# Patient Record
Sex: MALE | Race: WHITE | Employment: UNEMPLOYED | URBAN - METROPOLITAN AREA
[De-identification: names, ages, dates, MRNs, and addresses within clinical notes are randomized per-mention and may not be internally consistent; named-entity substitution may affect disease eponyms.]

---

## 2018-02-07 ENCOUNTER — HOSPITAL ENCOUNTER (INPATIENT)
Age: 23
LOS: 2 days | Discharge: HOME | DRG: 751 | End: 2018-02-09
Attending: PSYCHIATRY & NEUROLOGY | Admitting: PSYCHIATRY & NEUROLOGY
Payer: MEDICAID

## 2018-02-07 ENCOUNTER — HOSPITAL ENCOUNTER (EMERGENCY)
Age: 23
Discharge: ANOTHER HOSPITAL | End: 2018-02-07
Attending: EMERGENCY MEDICINE
Payer: MEDICAID

## 2018-02-07 VITALS
HEIGHT: 69 IN | WEIGHT: 229 LBS | DIASTOLIC BLOOD PRESSURE: 85 MMHG | TEMPERATURE: 98 F | OXYGEN SATURATION: 98 % | BODY MASS INDEX: 33.92 KG/M2 | RESPIRATION RATE: 14 BRPM | SYSTOLIC BLOOD PRESSURE: 132 MMHG | HEART RATE: 89 BPM

## 2018-02-07 DIAGNOSIS — R45.850 HOMICIDAL IDEATION: ICD-10-CM

## 2018-02-07 DIAGNOSIS — R45.851 SUICIDAL IDEATION: Primary | ICD-10-CM

## 2018-02-07 DIAGNOSIS — F29 PSYCHOSIS, UNSPECIFIED PSYCHOSIS TYPE (HCC): ICD-10-CM

## 2018-02-07 LAB
ALBUMIN SERPL-MCNC: 3.8 G/DL (ref 3.5–5)
ALBUMIN/GLOB SERPL: 1 {RATIO} (ref 1.1–2.2)
ALP SERPL-CCNC: 70 U/L (ref 45–117)
ALT SERPL-CCNC: 30 U/L (ref 12–78)
AMPHET UR QL SCN: NEGATIVE
ANION GAP SERPL CALC-SCNC: 10 MMOL/L (ref 5–15)
APPEARANCE UR: CLEAR
AST SERPL-CCNC: 16 U/L (ref 15–37)
BACTERIA URNS QL MICRO: NEGATIVE /HPF
BARBITURATES UR QL SCN: NEGATIVE
BENZODIAZ UR QL: NEGATIVE
BILIRUB SERPL-MCNC: 0.6 MG/DL (ref 0.2–1)
BILIRUB UR QL: NEGATIVE
BUN SERPL-MCNC: 7 MG/DL (ref 6–20)
BUN/CREAT SERPL: 9 (ref 12–20)
CALCIUM SERPL-MCNC: 9.1 MG/DL (ref 8.5–10.1)
CANNABINOIDS UR QL SCN: NEGATIVE
CHLORIDE SERPL-SCNC: 102 MMOL/L (ref 97–108)
CO2 SERPL-SCNC: 27 MMOL/L (ref 21–32)
COCAINE UR QL SCN: NEGATIVE
COLOR UR: NORMAL
CREAT SERPL-MCNC: 0.82 MG/DL (ref 0.7–1.3)
DATE LAST DOSE: ABNORMAL
DRUG SCRN COMMENT,DRGCM: NORMAL
EPITH CASTS URNS QL MICRO: NORMAL /LPF
ERYTHROCYTE [DISTWIDTH] IN BLOOD BY AUTOMATED COUNT: 13.1 % (ref 11.5–14.5)
ETHANOL SERPL-MCNC: <10 MG/DL
GLOBULIN SER CALC-MCNC: 3.8 G/DL (ref 2–4)
GLUCOSE SERPL-MCNC: 151 MG/DL (ref 65–100)
GLUCOSE UR STRIP.AUTO-MCNC: NEGATIVE MG/DL
HCT VFR BLD AUTO: 41.2 % (ref 36.6–50.3)
HGB BLD-MCNC: 14.2 G/DL (ref 12.1–17)
HGB UR QL STRIP: NEGATIVE
KETONES UR QL STRIP.AUTO: NEGATIVE MG/DL
LEUKOCYTE ESTERASE UR QL STRIP.AUTO: NEGATIVE
LITHIUM SERPL-SCNC: 0.48 MMOL/L (ref 0.6–1.2)
MCH RBC QN AUTO: 29 PG (ref 26–34)
MCHC RBC AUTO-ENTMCNC: 34.5 G/DL (ref 30–36.5)
MCV RBC AUTO: 84.1 FL (ref 80–99)
METHADONE UR QL: NEGATIVE
NITRITE UR QL STRIP.AUTO: NEGATIVE
NRBC # BLD: 0 K/UL (ref 0–0.01)
NRBC BLD-RTO: 0 PER 100 WBC
OPIATES UR QL: NEGATIVE
PCP UR QL: NEGATIVE
PH UR STRIP: 7 [PH]
PLATELET # BLD AUTO: 315 K/UL (ref 150–400)
PMV BLD AUTO: 10.2 FL (ref 8.9–12.9)
POTASSIUM SERPL-SCNC: 3.8 MMOL/L (ref 3.5–5.1)
PROT SERPL-MCNC: 7.6 G/DL (ref 6.4–8.2)
PROT UR STRIP-MCNC: NEGATIVE MG/DL
RBC # BLD AUTO: 4.9 M/UL (ref 4.1–5.7)
RBC #/AREA URNS HPF: NORMAL /HPF (ref 0–5)
REPORTED DOSE,DOSE: ABNORMAL UNITS
REPORTED DOSE/TIME,TMG: ABNORMAL
SODIUM SERPL-SCNC: 139 MMOL/L (ref 136–145)
SP GR UR REFRACTOMETRY: 1.01
UR CULT HOLD, URHOLD: NORMAL
UROBILINOGEN UR QL STRIP.AUTO: 0.2 EU/DL
VALPROATE SERPL-MCNC: 73 UG/ML (ref 50–100)
WBC # BLD AUTO: 10.1 K/UL (ref 4.1–11.1)
WBC URNS QL MICRO: NORMAL /HPF (ref 0–4)

## 2018-02-07 PROCEDURE — 90791 PSYCH DIAGNOSTIC EVALUATION: CPT

## 2018-02-07 PROCEDURE — 80307 DRUG TEST PRSMV CHEM ANLYZR: CPT | Performed by: EMERGENCY MEDICINE

## 2018-02-07 PROCEDURE — 80178 ASSAY OF LITHIUM: CPT | Performed by: EMERGENCY MEDICINE

## 2018-02-07 PROCEDURE — 85027 COMPLETE CBC AUTOMATED: CPT | Performed by: EMERGENCY MEDICINE

## 2018-02-07 PROCEDURE — 80164 ASSAY DIPROPYLACETIC ACD TOT: CPT | Performed by: EMERGENCY MEDICINE

## 2018-02-07 PROCEDURE — 74011250637 HC RX REV CODE- 250/637: Performed by: EMERGENCY MEDICINE

## 2018-02-07 PROCEDURE — 36415 COLL VENOUS BLD VENIPUNCTURE: CPT | Performed by: EMERGENCY MEDICINE

## 2018-02-07 PROCEDURE — 81001 URINALYSIS AUTO W/SCOPE: CPT | Performed by: EMERGENCY MEDICINE

## 2018-02-07 PROCEDURE — 65220000003 HC RM SEMIPRIVATE PSYCH

## 2018-02-07 PROCEDURE — 80053 COMPREHEN METABOLIC PANEL: CPT | Performed by: EMERGENCY MEDICINE

## 2018-02-07 PROCEDURE — 99284 EMERGENCY DEPT VISIT MOD MDM: CPT

## 2018-02-07 PROCEDURE — 74011250637 HC RX REV CODE- 250/637: Performed by: PSYCHIATRY & NEUROLOGY

## 2018-02-07 RX ORDER — LITHIUM CARBONATE 300 MG/1
300 CAPSULE ORAL DAILY
Status: ON HOLD | COMMUNITY
End: 2019-07-08

## 2018-02-07 RX ORDER — OLANZAPINE 10 MG/1
10 TABLET ORAL
COMMUNITY
End: 2018-02-09

## 2018-02-07 RX ORDER — IBUPROFEN 400 MG/1
400 TABLET ORAL
Status: DISCONTINUED | OUTPATIENT
Start: 2018-02-07 | End: 2018-02-09

## 2018-02-07 RX ORDER — IBUPROFEN 200 MG
1 TABLET ORAL
Status: DISCONTINUED | OUTPATIENT
Start: 2018-02-07 | End: 2018-02-09 | Stop reason: HOSPADM

## 2018-02-07 RX ORDER — BENZTROPINE MESYLATE 2 MG/1
2 TABLET ORAL
Status: DISCONTINUED | OUTPATIENT
Start: 2018-02-07 | End: 2018-02-09 | Stop reason: HOSPADM

## 2018-02-07 RX ORDER — DIVALPROEX SODIUM 500 MG/1
500 TABLET, DELAYED RELEASE ORAL 2 TIMES DAILY
Status: ON HOLD | COMMUNITY
End: 2019-07-08

## 2018-02-07 RX ORDER — LITHIUM CARBONATE 600 MG/1
600 CAPSULE ORAL
Status: ON HOLD | COMMUNITY
End: 2019-07-08

## 2018-02-07 RX ORDER — AMLODIPINE BESYLATE 5 MG/1
5 TABLET ORAL DAILY
Status: ON HOLD | COMMUNITY
End: 2019-07-08

## 2018-02-07 RX ORDER — LORAZEPAM 2 MG/ML
2 INJECTION INTRAMUSCULAR
Status: DISCONTINUED | OUTPATIENT
Start: 2018-02-07 | End: 2018-02-09 | Stop reason: HOSPADM

## 2018-02-07 RX ORDER — OLANZAPINE 5 MG/1
5 TABLET ORAL
Status: DISCONTINUED | OUTPATIENT
Start: 2018-02-07 | End: 2018-02-09 | Stop reason: HOSPADM

## 2018-02-07 RX ORDER — ACETAMINOPHEN 325 MG/1
650 TABLET ORAL
Status: DISCONTINUED | OUTPATIENT
Start: 2018-02-07 | End: 2018-02-09 | Stop reason: HOSPADM

## 2018-02-07 RX ORDER — ADHESIVE BANDAGE
30 BANDAGE TOPICAL DAILY PRN
Status: DISCONTINUED | OUTPATIENT
Start: 2018-02-07 | End: 2018-02-09 | Stop reason: HOSPADM

## 2018-02-07 RX ORDER — LORAZEPAM 1 MG/1
1 TABLET ORAL
Status: COMPLETED | OUTPATIENT
Start: 2018-02-07 | End: 2018-02-07

## 2018-02-07 RX ORDER — LORAZEPAM 1 MG/1
1 TABLET ORAL
Status: DISCONTINUED | OUTPATIENT
Start: 2018-02-07 | End: 2018-02-08

## 2018-02-07 RX ORDER — ZOLPIDEM TARTRATE 10 MG/1
10 TABLET ORAL
Status: DISCONTINUED | OUTPATIENT
Start: 2018-02-07 | End: 2018-02-09 | Stop reason: HOSPADM

## 2018-02-07 RX ORDER — BENZTROPINE MESYLATE 1 MG/ML
2 INJECTION INTRAMUSCULAR; INTRAVENOUS
Status: DISCONTINUED | OUTPATIENT
Start: 2018-02-07 | End: 2018-02-09 | Stop reason: HOSPADM

## 2018-02-07 RX ADMIN — LORAZEPAM 1 MG: 1 TABLET ORAL at 17:51

## 2018-02-07 RX ADMIN — ZOLPIDEM TARTRATE 10 MG: 10 TABLET ORAL at 22:16

## 2018-02-07 NOTE — IP AVS SNAPSHOT
Summary of Care Report The Summary of Care report has been created to help improve care coordination. Users with access to [employer] or [employer] (Web-based application) may access additional patient information including the Discharge Summary. If you are not currently a [employer] user and need more information, please call the number listed below in the [place] [address] section and ask to be connected with Medical Records. Facility Information Name Address Phone [facility] [address] [place] [id number] [phone] Patient Information Patient Name Sex  Iveth Tejada ([id number]) Male 1995 Discharge Information Admitting Provider Service Area Unit Leandro Resendiz MD / [address] [place] [address] / [phone] Discharge Provider Discharge Date/Time Discharge Disposition Destination (none) 2018 Afternoon (Pending) AHR (none) Patient Language Language ENGLISH [13] Hospital Problems as of 2018  Never Reviewed Class Noted - Resolved Last Modified POA Active Problems Hypertension  Unknown - Present 2018 by Leandro Resendiz MD Yes Entered by Leandro Resendiz MD  
  Borderline personality disorder  2018 - Present 2018 by Leandro Resendiz MD Yes Entered by Leandro Resendiz MD  
  * (Principal)Bipolar disorder [facility])  2018 - Present 2018 by Leandro Resendiz MD Yes Entered by Leandro Resendiz MD  
  
Non-Hospital Problems as of 2018  Never Reviewed Class Noted - Resolved Last Modified Active Problems   Psychosis  2018 - Present 2018 by Leandro Resendiz MD  
  Entered by Leandro Resendiz MD  
  Major depressive disorder with psychotic features [facility])  2018 - Present 2018 by Leandro Resendiz MD  
 Entered by Camron Fair MD  
  
You are allergic to the following Allergen Reactions Amoxicillin Angioedema Fish Containing Products Angioedema Penicillin G Angioedema Current Discharge Medication List  
  
CONTINUE these medications which have CHANGED Dose & Instructions Dispensing Information Comments * divalproex  mg tablet Commonly known as:  DEPAKOTE What changed:  Another medication with the same name was added. Make sure you understand how and when to take each. Dose:  500 mg Take 500 mg by mouth two (2) times a day. Refills:  0  
   
 * divalproex  mg tablet Commonly known as:  DEPAKOTE What changed: You were already taking a medication with the same name, and this prescription was added. Make sure you understand how and when to take each. Dose:  500 mg Take 1 Tab by mouth two (2) times a day for 14 days. Indications: MIXED BIPOLAR I DISORDER Quantity:  28 Tab Refills:  0  
   
 * lithium carbonate 300 mg capsule What changed:  Another medication with the same name was added. Make sure you understand how and when to take each. Dose:  300 mg Take 300 mg by mouth daily. Refills:  0  
   
 * lithium carbonate 600 mg capsule What changed:  Another medication with the same name was added. Make sure you understand how and when to take each. Dose:  600 mg Take 600 mg by mouth nightly. Refills:  0  
   
 * lithium carbonate 600 mg capsule What changed: You were already taking a medication with the same name, and this prescription was added. Make sure you understand how and when to take each. Dose:  600 mg Take 1 Cap by mouth nightly for 14 days. Indications: DEPRESSION ASSOCIATED WITH BIPOLAR DISORDER Quantity:  14 Cap Refills:  0  
   
 * lithium carbonate 300 mg capsule Start taking on:  2/10/2018 What changed:   You were already taking a medication with the same name, and this prescription was added. Make sure you understand how and when to take each. Dose:  300 mg Take 1 Cap by mouth daily for 14 days. Indications: DEPRESSION ASSOCIATED WITH BIPOLAR DISORDER Quantity:  14 Cap Refills:  0  
   
 * Notice: This list has 6 medication(s) that are the same as other medications prescribed for you. Read the directions carefully, and ask your doctor or other care provider to review them with you. CONTINUE these medications which have NOT CHANGED Dose & Instructions Dispensing Information Comments  
 amLODIPine 5 mg tablet Commonly known as:  Cookie Boozer Dose:  5 mg Take 5 mg by mouth daily. Refills:  0  
   
 OLANZapine 10 mg tablet Commonly known as:  ZyPREXA Dose:  10 mg Take 1 Tab by mouth nightly for 14 days. Indications: MIXED BIPOLAR I DISORDER Quantity:  14 Tab Refills:  0 Follow-up Information Follow up With Details Comments Contact Info Kari Carrion MS  Medication management appointment on February 21st at 10:00 [address]  
[phone] Fax: [phone] Emergency services: [phone] None   None (395) Patient stated that they have no PCP Discharge Instructions DISCHARGE SUMMARY from Nurse PATIENT INSTRUCTIONS: 
What to do at Home: 
Recommended activity: Activity as tolerated *  Please give a list of your current medications to your Primary Care Provider. *  Please update this list whenever your medications are discontinued, doses are 
    changed, or new medications (including over-the-counter products) are added. *  Please carry medication information at all times in case of emergency situations. These are general instructions for a healthy lifestyle: No smoking/ No tobacco products/ Avoid exposure to second hand smoke Surgeon General's Warning:  Quitting smoking now greatly reduces serious risk to your health. Obesity, smoking, and sedentary lifestyle greatly increases your risk for illness A healthy diet, regular physical exercise & weight monitoring are important for maintaining a healthy lifestyle The discharge information has been reviewed with the patient. The patient verbalized understanding. Discharge medications reviewed with the patient and appropriate educational materials and side effects teaching were provided. ___________________________________________________________________________________________________________________________________ Chidi Dover If I feel I am at risk of hurting myself or others, I will call the crisis office and speak with a crisis worker who will assist me during my crisis. Chidi Dover PHRQL  [phone] [facility] [place] [address] [phone] [place] [address]  [phone] [employer]-   [phone] [employer]-  [phone] 0 [facility]  [phone] [address] [place] [address]  [phone] Chart Review Routing History No Routing History on File

## 2018-02-07 NOTE — IP AVS SNAPSHOT
Sintia Byrnes 
 
 
 [address] Patient: Sohan Sparks MRN: [id number] :1995 About your hospitalization You were admitted on:  2018 You last received care in the:  [address] You were discharged on:  2018 Why you were hospitalized Your primary diagnosis was:  Bipolar Disorder (Hcc) Your diagnoses also included:  Hypertension, Borderline Personality Disorder Follow-up Information Follow up With Details Comments Contact Info Gloria Solitario. MS Sheyla  Medication management appointment on  at 10:00 [address]  
[phone] Fax: [phone] Emergency services: [phone] None   None (395) Patient stated that they have no PCP Discharge Orders None A check sharron indicates which time of day the medication should be taken. My Medications CHANGE how you take these medications Instructions Each Dose to Equal  
 Morning Noon Evening Bedtime * divalproex  mg tablet Commonly known as:  DEPAKOTE What changed:  Another medication with the same name was added. Make sure you understand how and when to take each. Your last dose was: Your next dose is: Take 500 mg by mouth two (2) times a day. 500 mg  
    
   
   
   
  
 * divalproex  mg tablet Commonly known as:  DEPAKOTE What changed: You were already taking a medication with the same name, and this prescription was added. Make sure you understand how and when to take each. Your last dose was: Your next dose is: Take 1 Tab by mouth two (2) times a day for 14 days. Indications: MIXED BIPOLAR I DISORDER  
 500 mg  
    
   
   
   
  
 * lithium carbonate 300 mg capsule What changed:  Another medication with the same name was added.  Make sure you understand how and when to take each. Your last dose was: Your next dose is: Take 300 mg by mouth daily. 300 mg  
    
   
   
   
  
 * lithium carbonate 600 mg capsule What changed:  Another medication with the same name was added. Make sure you understand how and when to take each. Your last dose was: Your next dose is: Take 600 mg by mouth nightly. 600 mg  
    
   
   
   
  
 * lithium carbonate 600 mg capsule What changed: You were already taking a medication with the same name, and this prescription was added. Make sure you understand how and when to take each. Your last dose was: Your next dose is: Take 1 Cap by mouth nightly for 14 days. Indications: DEPRESSION ASSOCIATED WITH BIPOLAR DISORDER  
 600 mg  
    
   
   
   
  
 * lithium carbonate 300 mg capsule Start taking on:  2/10/2018 What changed: You were already taking a medication with the same name, and this prescription was added. Make sure you understand how and when to take each. Your last dose was: Your next dose is: Take 1 Cap by mouth daily for 14 days. Indications: DEPRESSION ASSOCIATED WITH BIPOLAR DISORDER  
 300 mg * Notice: This list has 6 medication(s) that are the same as other medications prescribed for you. Read the directions carefully, and ask your doctor or other care provider to review them with you. CONTINUE taking these medications Instructions Each Dose to Equal  
 Morning Noon Evening Bedtime  
 amLODIPine 5 mg tablet Commonly known as:  Alejandrina Boast Your last dose was: Your next dose is: Take 5 mg by mouth daily. 5 mg OLANZapine 10 mg tablet Commonly known as:  ZyPREXA Your last dose was: Your next dose is: Take 1 Tab by mouth nightly for 14 days.  Indications: MIXED BIPOLAR I DISORDER  
 10 mg Where to Get Your Medications Information on where to get these meds will be given to you by the nurse or doctor. ! Ask your nurse or doctor about these medications  
  divalproex  mg tablet  
 lithium carbonate 300 mg capsule  
 lithium carbonate 600 mg capsule OLANZapine 10 mg tablet Discharge Instructions DISCHARGE SUMMARY from Nurse PATIENT INSTRUCTIONS: 
What to do at Home: 
Recommended activity: Activity as tolerated *  Please give a list of your current medications to your Primary Care Provider. *  Please update this list whenever your medications are discontinued, doses are 
    changed, or new medications (including over-the-counter products) are added. *  Please carry medication information at all times in case of emergency situations. These are general instructions for a healthy lifestyle: No smoking/ No tobacco products/ Avoid exposure to second hand smoke Surgeon General's Warning:  Quitting smoking now greatly reduces serious risk to your health. Obesity, smoking, and sedentary lifestyle greatly increases your risk for illness A healthy diet, regular physical exercise & weight monitoring are important for maintaining a healthy lifestyle The discharge information has been reviewed with the patient. The patient verbalized understanding. Discharge medications reviewed with the patient and appropriate educational materials and side effects teaching were provided. ___________________________________________________________________________________________________________________________________ Millicent Dugan If I feel I am at risk of hurting myself or others, I will call the crisis office and speak with a crisis worker who will assist me during my crisis. Millicent Dugan [facility]  [phone] [address] [place] [address] [phone] [place] [address]  [phone] [employer] [phone]- [phone] [employer]- [phone] [facility]  [phone] [address] [facility]  [phone] [employer] Announcement We are excited to announce that we are making your provider's discharge notes available to you in [employer]. You will see these notes when they are completed and signed by the physician that discharged you from your recent hospital stay. If you have any questions or concerns about any information you see in Moobia, please call the Health Information Department where you were seen or reach out to your Primary Care Provider for more information about your plan of care. Introducing [facility]! [facility] introduces Moobia patient portal. Now you can access parts of your medical record, email your doctor's office, and request medication refills online. 1. In your internet browser, go to [email or web address] 2. Click on the First Time User? Click Here link in the Sign In box. You will see the New Member Sign Up page. 3. Enter your Moobia Access Code exactly as it appears below. You will not need to use this code after youve completed the sign-up process. If you do not sign up before the expiration date, you must request a new code. · Moobia Access Code: [email or web address] Expires: 5/9/2018  4:07 PM 
 
4. Enter the last four digits of your Social Security Number (xxxx) and Date of Birth (mm/dd/yyyy) as indicated and click Submit. You will be taken to the next sign-up page. 5. Create a Moobia ID. This will be your Moobia login ID and cannot be changed, so think of one that is secure and easy to remember. 6. Create a Moobia password. You can change your password at any time. 7. Enter your Password Reset Question and Answer. This can be used at a later time if you forget your password. 8. Enter your e-mail address. You will receive e-mail notification when new information is available in [address]. 9. Click Sign Up.  You can now view and download portions of your medical record. 10. Click the Download Summary menu link to download a portable copy of your medical information. If you have questions, please visit the Frequently Asked Questions section of the [employer] website. Remember, [employer] is NOT to be used for urgent needs. For medical emergencies, dial 911. Now available from your iPhone and Android! Providers Seen During Your Hospitalization Provider Specialty Primary office phone Hannah Adkins MD Psychiatry [phone] Your Primary Care Physician (PCP) Primary Care Physician Office Phone Office Fax NONE ** None ** ** None ** You are allergic to the following Allergen Reactions Amoxicillin Angioedema Fish Containing Products Angioedema Penicillin G Angioedema Recent Documentation Height Weight BMI Smoking Status 1.753 m 103.9 kg 33.82 kg/m2 Current Every Day Smoker Emergency Contacts Name Discharge Info Relation Home Work Mobile None,Given N/A  AT THIS TIME [6]  [phone] Patient Belongings The following personal items are in your possession at time of discharge: 
  Dental Appliances: None  Visual Aid: None      Home Medications: None   Jewelry: None  Clothing: With patient, Undergarments, Pants, Socks, Shirt    Other Valuables: Cell Phone  Personal Items Sent to Safe: cell phone Please provide this summary of care documentation to your next provider. Signatures-by signing, you are acknowledging that this After Visit Summary has been reviewed with you and you have received a copy. Patient Signature:  ____________________________________________________________ Date:  ____________________________________________________________  
  
Osvaldo Whitaker Provider Signature:  ____________________________________________________________ Date:  ____________________________________________________________
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
## 2018-02-07 NOTE — ED TRIAGE NOTES
Pt was released from [facility] today after being admitted since Monday for a mental health admission and on his way back to [facility] via cab he asked them to pull over so he could call the police to bring him to the ED since he was hearing voices again. He states that they are telling him to hurt himself or someone else.
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
## 2018-02-07 NOTE — IP AVS SNAPSHOT
[address] [facility] 
 
 
 [address] [place] [address] Snider Patient: Rojelio Suggs MRN: [id number] :1995 A check sharron indicates which time of day the medication should be taken. My Medications CHANGE how you take these medications Instructions Each Dose to Equal  
 Morning Noon Evening Bedtime * divalproex  mg tablet Commonly known as:  DEPAKOTE What changed:  Another medication with the same name was added. Make sure you understand how and when to take each. Your last dose was: Your next dose is: Take 500 mg by mouth two (2) times a day. 500 mg  
    
   
   
   
  
 * divalproex  mg tablet Commonly known as:  DEPAKOTE What changed: You were already taking a medication with the same name, and this prescription was added. Make sure you understand how and when to take each. Your last dose was: Your next dose is: Take 1 Tab by mouth two (2) times a day for 14 days. Indications: MIXED BIPOLAR I DISORDER  
 500 mg  
    
   
   
   
  
 * lithium carbonate 300 mg capsule What changed:  Another medication with the same name was added. Make sure you understand how and when to take each. Your last dose was: Your next dose is: Take 300 mg by mouth daily. 300 mg  
    
   
   
   
  
 * lithium carbonate 600 mg capsule What changed:  Another medication with the same name was added. Make sure you understand how and when to take each. Your last dose was: Your next dose is: Take 600 mg by mouth nightly. 600 mg  
    
   
   
   
  
 * lithium carbonate 600 mg capsule What changed: You were already taking a medication with the same name, and this prescription was added. Make sure you understand how and when to take each. Your last dose was: Your next dose is: Take 1 Cap by mouth nightly for 14 days. Indications: DEPRESSION ASSOCIATED WITH BIPOLAR DISORDER  
 600 mg  
    
   
   
   
  
 * lithium carbonate 300 mg capsule Start taking on:  2/10/2018 What changed: You were already taking a medication with the same name, and this prescription was added. Make sure you understand how and when to take each. Your last dose was: Your next dose is: Take 1 Cap by mouth daily for 14 days. Indications: DEPRESSION ASSOCIATED WITH BIPOLAR DISORDER  
 300 mg * Notice: This list has 6 medication(s) that are the same as other medications prescribed for you. Read the directions carefully, and ask your doctor or other care provider to review them with you. CONTINUE taking these medications Instructions Each Dose to Equal  
 Morning Noon Evening Bedtime  
 amLODIPine 5 mg tablet Commonly known as:  Cristal Mclain Your last dose was: Your next dose is: Take 5 mg by mouth daily. 5 mg OLANZapine 10 mg tablet Commonly known as:  ZyPREXA Your last dose was: Your next dose is: Take 1 Tab by mouth nightly for 14 days. Indications: MIXED BIPOLAR I DISORDER  
 10 mg Where to Get Your Medications Information on where to get these meds will be given to you by the nurse or doctor. ! Ask your nurse or doctor about these medications  
  divalproex  mg tablet  
 lithium carbonate 300 mg capsule  
 lithium carbonate 600 mg capsule OLANZapine 10 mg tablet

## 2018-02-08 PROBLEM — F29 PSYCHOSIS (HCC): Status: ACTIVE | Noted: 2018-02-08

## 2018-02-08 PROBLEM — F60.3 BORDERLINE PERSONALITY DISORDER (HCC): Status: ACTIVE | Noted: 2018-02-08

## 2018-02-08 PROBLEM — F32.3 MAJOR DEPRESSIVE DISORDER WITH PSYCHOTIC FEATURES (HCC): Status: ACTIVE | Noted: 2018-02-08

## 2018-02-08 PROCEDURE — 65220000003 HC RM SEMIPRIVATE PSYCH

## 2018-02-08 PROCEDURE — 74011250637 HC RX REV CODE- 250/637: Performed by: PSYCHIATRY & NEUROLOGY

## 2018-02-08 RX ORDER — LITHIUM CARBONATE 300 MG/1
300 CAPSULE ORAL DAILY
Status: DISCONTINUED | OUTPATIENT
Start: 2018-02-09 | End: 2018-02-09 | Stop reason: HOSPADM

## 2018-02-08 RX ORDER — DIVALPROEX SODIUM 500 MG/1
500 TABLET, DELAYED RELEASE ORAL 2 TIMES DAILY
Status: DISCONTINUED | OUTPATIENT
Start: 2018-02-08 | End: 2018-02-09 | Stop reason: HOSPADM

## 2018-02-08 RX ORDER — OLANZAPINE 10 MG/1
10 TABLET ORAL
Status: DISCONTINUED | OUTPATIENT
Start: 2018-02-08 | End: 2018-02-09 | Stop reason: HOSPADM

## 2018-02-08 RX ORDER — LITHIUM CARBONATE 300 MG/1
600 CAPSULE ORAL
Status: DISCONTINUED | OUTPATIENT
Start: 2018-02-08 | End: 2018-02-09 | Stop reason: HOSPADM

## 2018-02-08 RX ORDER — AMLODIPINE BESYLATE 5 MG/1
5 TABLET ORAL DAILY
Status: DISCONTINUED | OUTPATIENT
Start: 2018-02-08 | End: 2018-02-09 | Stop reason: HOSPADM

## 2018-02-08 RX ADMIN — AMLODIPINE BESYLATE 5 MG: 5 TABLET ORAL at 18:46

## 2018-02-08 RX ADMIN — ACETAMINOPHEN 650 MG: 325 TABLET, FILM COATED ORAL at 14:37

## 2018-02-08 RX ADMIN — DIVALPROEX SODIUM 500 MG: 500 TABLET, DELAYED RELEASE ORAL at 18:46

## 2018-02-08 RX ADMIN — OLANZAPINE 10 MG: 10 TABLET, FILM COATED ORAL at 21:04

## 2018-02-08 RX ADMIN — LITHIUM CARBONATE 600 MG: 300 CAPSULE, GELATIN COATED ORAL at 21:04

## 2018-02-08 RX ADMIN — OLANZAPINE 5 MG: 5 TABLET, FILM COATED ORAL at 12:01

## 2018-02-08 NOTE — PROGRESS NOTES
Spiritual Care Assessment/Progress Notes    Dino Adam [id number]  [id number]    1995  20 y.o.  male    Patient Telephone Number: [phone] (home)   Tenriism Affiliation: Kimani Friend   Language: English   Extended Emergency Contact Information  Primary Emergency Contact: [address] [facility] Phone: [phone]  Relation: None   Patient Active Problem List    Diagnosis Date Noted    Psychosis 02/08/2018    Major depressive disorder with psychotic features ([address] 02/08/2018    Borderline personality disorder 02/08/2018    Hypertension         Date: 2/8/2018       Level of Tenriism/Spiritual Activity:  []         Involved in alexis tradition/spiritual practice    []         Not involved in alexis tradition/spiritual practice  []         Spiritually oriented    []         Claims no spiritual orientation    []         seeking spiritual identity  []         Feels alienated from Jehovah's witness practice/tradition  []         Feels angry about Jehovah's witness practice/tradition  []         Spirituality/Jehovah's witness tradition  a resource for coping at this time.   [x]         Not able to assess due to medical condition    Services Provided Today:  []         crisis intervention    []         reading Scriptures  []         spiritual assessment    []         prayer  []         empathic listening/emotional support  []         rites and rituals (cite in comments)  []         life review     []         Jehovah's witness support  []         theological development   []         advocacy  []         ethical dialog     []         blessing  []         bereavement support    []         support to family  []         anticipatory grief support   []         help with AMD  []         spiritual guidance    []         meditation      Spiritual Care Needs  []         Emotional Support  []         Spiritual/Tenriism Care  []         Loss/Adjustment  []         Advocacy/Referral                /Ethics  []         No needs expressed at               this time  [x]         Other: (note in               comments)  Spiritual Care Plan  []         Follow up visits with               pt/family  []         Provide materials  []         Schedule sacraments  []         Contact Community               Clergy  [x]         Follow up as needed  []         Other: (note in               comments)     Comments: Patient requested  support and  was paged by Behavioral health Unit staff. When  arrived, pt was asleep and when RN woke pt up, pt shared that he did not want to see the  at this time.  acknowledged pt's request. Please notify chaplains if needs rise. Alayna Skelton, M.S.   Spiritual Care Department  If needs rise please call HEATHER ([phone])

## 2018-02-08 NOTE — BH NOTES
GROUP THERAPY PROGRESS NOTE    The patient Ryan perez 20 y.o. male is participating in Coping Skills Group. Group time: 45 minutes    Personal goal for participation: To participate in coping skills memory game    Goal orientation:  personal    Group therapy participation: active    Therapeutic interventions reviewed and discussed: positive coping strategies a-z    Impression of participation:  The patient was attentive.     Meghan Mccauley  2/8/2018  5:48 PM

## 2018-02-08 NOTE — H&P
History and Physical    Subjective: Gumaro Gonzalez is a 20 y.o. male with past medical hx significant for HTN and Asthma. Pt admitted under a temporary group home order (TDO) severe psychosis and SI proving to be an imminent danger to self. Pt is  WHITE [place]  male with a past psychiatric history significant for psychosis, who presents at this time with complaints of (and/or evidence of) the following emotional symptoms: suicidal thoughts/threats, psychotic behavior and homicidal thoughts/threats. Additional symptomatology include \"hearing voices\", vague and inconsistent sx . Pt was dc from [facility] and was on his way to [facility] in a cab when he asked the  to stop. Pt then called emergency services stating he is hearing voices to harm self/others. Pt denies glendy. The above symptoms have been present for few month. These symptoms are of moderate severity. These symptoms are intermittent/ fleeting in nature. The patient's condition has been precipitated by and psychosocial stressors (conflict with sister, homeless ). Patient's condition made worse by treatment. Pt denies any acute medical issues. Pt is showing no signs of acute distress. Past Medical History:   Diagnosis Date    Asthma     Hypertension     Psychiatric disorder     schitzoeffective disorder, depression, PTSD      FHx:DM, HTN, Depression     Social History   Substance Use Topics    Smoking status: Current Every Day Smoker    Smokeless tobacco: Never Used    Alcohol use No       Prior to Admission medications    Medication Sig Start Date End Date Taking? Authorizing Provider   amLODIPine (NORVASC) 5 mg tablet Take 5 mg by mouth daily. Yes Jessica Gonzales MD   divalproex DR (DEPAKOTE) 500 mg tablet Take 500 mg by mouth two (2) times a day. Yes Jessica Gonzales MD   lithium carbonate 300 mg capsule Take 300 mg by mouth daily. Yes Jessica Gonzales MD   lithium carbonate 600 mg capsule Take 600 mg by mouth nightly.    Yes Jessica Gonzales MD OLANZapine (ZYPREXA) 10 mg tablet Take 10 mg by mouth nightly. Yes Phys Other, MD     Allergies   Allergen Reactions    Amoxicillin Angioedema    Fish Containing Products Angioedema    Penicillin G Angioedema        Review of Systems:  Constitutional: negative  Eyes: negative  Ears, Nose, Mouth, Throat, and Face: negative  Respiratory: negative  Cardiovascular: negative  Gastrointestinal: negative  Genitourinary:negative  Integument/Breast: negative  Hematologic/Lymphatic: negative  Musculoskeletal:negative  Neurological: negative  Behavioral/Psychiatric: psychosis  Endocrine: negative  Allergic/Immunologic: negative     Objective: Intake and Output:            Physical Exam:   Visit Vitals    /86    Pulse 74    Temp 97.6 °F (36.4 °C)    Resp 18    Ht 5' 9\" (1.753 m)    Wt 103.9 kg (229 lb)    BMI 33.82 kg/m2     General:  Alert, cooperative, no distress, appears stated age. Head:  Normocephalic, without obvious abnormality, atraumatic. Eyes:  Conjunctivae/corneas clear. PERRL, EOMs intact. Ears:  Normal external ear canals both ears. Nose: Nares normal. Septum midline. Mucosa normal. No drainage or sinus tenderness. Throat: Lips, mucosa, and tongue normal. Teeth and gums normal.   Neck: Supple, symmetrical, trachea midline, no adenopathy, thyroid: no enlargement/tenderness/nodules, no carotid bruit and no JVD. Back:   Symmetric, no curvature. ROM normal. No CVA tenderness. Lungs:   Clear to auscultation bilaterally. Chest wall:  No tenderness or deformity. Heart:  Regular rate and rhythm, S1, S2 normal, no murmur, click, rub or gallop. Abdomen:   Soft, non-tender. Bowel sounds normal. No masses,  No organomegaly. Extremities: Extremities normal, atraumatic, no cyanosis or edema. Pulses: 2+ and symmetric all extremities.    Skin: Skin color, texture, turgor normal. No rashes or lesions   Lymph nodes: Cervical, supraclavicular, and axillary nodes normal.   Neurologic: CNII-XII intact. Normal strength, sensation and reflexes throughout. Data Review:   No results found for this or any previous visit (from the past 24 hour(s)). Assessment:     Principal Problem:    Major depressive disorder with psychotic features ([facility] [address] (2/8/2018)    Active Problems:    Hypertension ()      Borderline personality disorder (2/8/2018)    Asthma    Plan:     Restart Norvasc    Rescue inhaler if needed. No VTE prophylaxis indicated or necessary at this time.    Signed By: Gonzalez Whittington MD     February 8, 2018

## 2018-02-08 NOTE — BH NOTES
Pt tearful and c/o hearing voices. Zyprexa 5mg given PO. Will monitor medication effectiveness and assess needs.

## 2018-02-08 NOTE — PROGRESS NOTES
Problem: Aggression and Hostility (Behavioral Health)  Goal: *Demonstrate ability to comply with simple direction  Outcome: Progressing Towards Goal  Patient is visible on the unit. Focused on discharge. Guarded. Poor hygiene. Attending groups and socializing with peers. Will continue to monitor patient and assess needs.

## 2018-02-08 NOTE — PROGRESS NOTES
Problem: Aggression and Hostility (Behavioral Health)  Goal: *Demonstrate ability to comply with simple direction  Outcome: Progressing Towards Goal  Pt rested quietly in bed with eyes closed. No signs/symptoms of distress, agitation, or anxiety. Will monitor for changes. Q 15 minute checks continue.  Pt slept 7 hrs

## 2018-02-08 NOTE — BH NOTES
GROUP THERAPY PROGRESS NOTE    The patient Tye Luevano is participating in [employer]. Group time: 30 minutes    Personal goal for participation: to orient the patient to the unit.     Goal orientation: successful adoption of unit rules    Group therapy participation: minimal    Therapeutic interventions reviewed and discussed: Yes    Impression of participation:real Lyons  2/8/2018 1:51 PM
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
## 2018-02-08 NOTE — ED NOTES
TRANSFER - OUT REPORT:    Verbal report given to Shy Avila on Jorge L Alonzo  being transferred to [facility](unit) for urgent transfer       Report consisted of patients Situation, Background, Assessment and   Recommendations(SBAR). Information from the following report(s) SBAR, Kardex, [facility] and Recent Results was reviewed with the receiving nurse. Lines:       Opportunity for questions and clarification was provided.       Patient transported with:   BLS    ETA 60-90 minutes [facility]
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
## 2018-02-09 VITALS
HEIGHT: 69 IN | RESPIRATION RATE: 20 BRPM | SYSTOLIC BLOOD PRESSURE: 93 MMHG | TEMPERATURE: 96.8 F | WEIGHT: 229 LBS | DIASTOLIC BLOOD PRESSURE: 55 MMHG | BODY MASS INDEX: 33.92 KG/M2 | HEART RATE: 52 BPM

## 2018-02-09 PROBLEM — F31.9 BIPOLAR DISORDER (HCC): Status: ACTIVE | Noted: 2018-02-09

## 2018-02-09 PROCEDURE — 74011250637 HC RX REV CODE- 250/637: Performed by: PSYCHIATRY & NEUROLOGY

## 2018-02-09 RX ORDER — LITHIUM CARBONATE 600 MG/1
600 CAPSULE ORAL
Qty: 14 CAP | Refills: 0 | Status: SHIPPED | OUTPATIENT
Start: 2018-02-09 | End: 2018-02-23

## 2018-02-09 RX ORDER — LITHIUM CARBONATE 300 MG/1
300 CAPSULE ORAL DAILY
Qty: 14 CAP | Refills: 0 | Status: SHIPPED | OUTPATIENT
Start: 2018-02-10 | End: 2018-02-24

## 2018-02-09 RX ORDER — OLANZAPINE 10 MG/1
10 TABLET ORAL
Qty: 14 TAB | Refills: 0 | Status: SHIPPED | OUTPATIENT
Start: 2018-02-09 | End: 2018-02-23

## 2018-02-09 RX ORDER — DIVALPROEX SODIUM 500 MG/1
500 TABLET, DELAYED RELEASE ORAL 2 TIMES DAILY
Qty: 28 TAB | Refills: 0 | Status: SHIPPED | OUTPATIENT
Start: 2018-02-09 | End: 2018-02-23

## 2018-02-09 RX ORDER — ALBUTEROL SULFATE 90 UG/1
2 AEROSOL, METERED RESPIRATORY (INHALATION)
Status: DISCONTINUED | OUTPATIENT
Start: 2018-02-09 | End: 2018-02-09 | Stop reason: HOSPADM

## 2018-02-09 RX ADMIN — LITHIUM CARBONATE 300 MG: 300 CAPSULE, GELATIN COATED ORAL at 08:09

## 2018-02-09 RX ADMIN — ACETAMINOPHEN 650 MG: 325 TABLET, FILM COATED ORAL at 11:53

## 2018-02-09 RX ADMIN — DIVALPROEX SODIUM 500 MG: 500 TABLET, DELAYED RELEASE ORAL at 08:15

## 2018-02-09 RX ADMIN — AMLODIPINE BESYLATE 5 MG: 5 TABLET ORAL at 08:15

## 2018-02-09 NOTE — BH NOTES
Behavioral Health Transition Record to Provider    Patient Name: Arcelia Dugan  YOB: 1995  Medical Record Number: [id number]  Date of Admission: 2/7/2018  Date of Discharge: 2/9/2018    Attending Provider: Saintclair Estelle, MD  Discharging Provider: Saintclair Estelle, MD  To contact this individual call [phone] and ask the  to page. If unavailable, ask to be transferred to [facility] Provider on call. [facility] Provider will be available on call 24/7 and during holidays. Primary Care Provider: None    Allergies   Allergen Reactions    Amoxicillin Angioedema    Fish Containing Products Angioedema    Penicillin G Angioedema       Reason for Admission: Auditory hallucinations. Admission Diagnosis: psychosis  Psychosis    * No surgery found *    Results for orders placed or performed during the hospital encounter of 02/07/18   URINE CULTURE HOLD SAMPLE   Result Value Ref Range    Urine culture hold        URINE ON HOLD IN MICROBIOLOGY DEPT FOR 3 DAYS. IF UNPRESERVED URINE IS SUBMITTED, IT CANNOT BE USED FOR ADDITIONAL TESTING AFTER 24 HRS, RECOLLECTION WILL BE REQUIRED.    URINALYSIS W/MICROSCOPIC   Result Value Ref Range    Color LIGHT YELLOW     Appearance CLEAR      Specific gravity 1.010      pH (UA) 7.0      Protein NEGATIVE  mg/dL    Glucose NEGATIVE  mg/dL    Ketone NEGATIVE  mg/dL    Bilirubin NEGATIVE       Blood NEGATIVE       Urobilinogen 0.2 EU/dL    Nitrites NEGATIVE       Leukocyte Esterase NEGATIVE       WBC 0-4 0 - 4 /hpf    RBC 0-5 0 - 5 /hpf    Epithelial cells FEW FEW /lpf    Bacteria NEGATIVE  NEG /hpf   DRUG SCREEN, URINE   Result Value Ref Range    AMPHETAMINES NEGATIVE  NEG      BARBITURATES NEGATIVE  NEG      BENZODIAZEPINES NEGATIVE  NEG      COCAINE NEGATIVE  NEG      METHADONE NEGATIVE  NEG      OPIATES NEGATIVE  NEG      PCP(PHENCYCLIDINE) NEGATIVE  NEG      THC (TH-CANNABINOL) NEGATIVE  NEG      Drug screen comment (NOTE)    LITHIUM   Result Value Ref Range    Lithium level 0.48 (L) 0.60 - 1.20 MMOL/L    Reported dose date: NOT PROVIDED      Reported dose time: NOT PROVIDED      Reported dose: NOT PROVIDED UNITS   CBC W/O DIFF   Result Value Ref Range    WBC 10.1 4.1 - 11.1 K/uL    RBC 4.90 4. 10 - 5.70 M/uL    HGB 14.2 12.1 - 17.0 g/dL    HCT 41.2 36.6 - 50.3 %    MCV 84.1 80.0 - 99.0 FL    MCH 29.0 26.0 - 34.0 PG    MCHC 34.5 30.0 - 36.5 g/dL    RDW 13.1 11.5 - 14.5 %    PLATELET 456 168 - 339 K/uL    MPV 10.2 8.9 - 12.9 FL    NRBC 0.0 0  WBC    ABSOLUTE NRBC 0.00 0.00 - 3.62 K/uL   METABOLIC PANEL, COMPREHENSIVE   Result Value Ref Range    Sodium 139 136 - 145 mmol/L    Potassium 3.8 3.5 - 5.1 mmol/L    Chloride 102 97 - 108 mmol/L    CO2 27 21 - 32 mmol/L    Anion gap 10 5 - 15 mmol/L    Glucose 151 (H) 65 - 100 mg/dL    BUN 7 6 - 20 MG/DL    Creatinine 0.82 0.70 - 1.30 MG/DL    BUN/Creatinine ratio 9 (L) 12 - 20      GFR est AA >60 >60 ml/min/1.73m2    GFR est non-AA >60 >60 ml/min/1.73m2    Calcium 9.1 8.5 - 10.1 MG/DL    Bilirubin, total 0.6 0.2 - 1.0 MG/DL    ALT (SGPT) 30 12 - 78 U/L    AST (SGOT) 16 15 - 37 U/L    Alk. phosphatase 70 45 - 117 U/L    Protein, total 7.6 6.4 - 8.2 g/dL    Albumin 3.8 3.5 - 5.0 g/dL    Globulin 3.8 2.0 - 4.0 g/dL    A-G Ratio 1.0 (L) 1.1 - 2.2     ETHYL ALCOHOL   Result Value Ref Range    ALCOHOL(ETHYL),SERUM <10 <10 MG/DL   VALPROIC ACID   Result Value Ref Range    Valproic acid 73 50 - 100 ug/ml       Immunizations administered during this encounter: There is no immunization history on file for this patient. Screening for Metabolic Disorders for Patients on Antipsychotic Medications  (Data obtained from the EMR)    Estimated Body Mass Index  Estimated body mass index is 33.82 kg/(m^2) as calculated from the following:    Height as of this encounter: 5' 9\" (1.753 m). Weight as of this encounter: 103.9 kg (229 lb).      Vital Signs/Blood Pressure  Visit Vitals    BP 93/55    Pulse (!) 52    Temp 96.8 °F (36 °C)    Resp 20    Ht 5' 9\" (1.753 m)    Wt 103.9 kg (229 lb)    BMI 33.82 kg/m2       Blood Glucose/Hemoglobin A1c  Lab Results   Component Value Date/Time    Glucose 151 (H) 02/07/2018 04:46 PM       No results found for: HBA1C, HGBE8, LLE1SILW     Lipid Panel  No results found for: CHOL, CHOLX, CHLST, CHOLV, 833411, HDL, LDL, LDLC, DLDLP, TGLX, TRIGL, TRIGP, CHHD, CHHDX     Discharge Diagnosis: Please refer to physician's discharge summary. Discharge Plan: Pt will be discharging home to his apartment in [place]. Pt will be transferred by [facility] - phone number [phone] Confirmation number #[id number]. Pt will meet with mental health skill builder once home. Discharge Medication List and Instructions:   Current Discharge Medication List      CONTINUE these medications which have CHANGED    Details   !! divalproex DR (DEPAKOTE) 500 mg tablet Take 1 Tab by mouth two (2) times a day for 14 days. Indications: MIXED BIPOLAR I DISORDER  Qty: 28 Tab, Refills: 0      !! lithium carbonate 300 mg capsule Take 1 Cap by mouth daily for 14 days. Indications: DEPRESSION ASSOCIATED WITH BIPOLAR DISORDER  Qty: 14 Cap, Refills: 0      !! lithium carbonate 600 mg capsule Take 1 Cap by mouth nightly for 14 days. Indications: DEPRESSION ASSOCIATED WITH BIPOLAR DISORDER  Qty: 14 Cap, Refills: 0      OLANZapine (ZYPREXA) 10 mg tablet Take 1 Tab by mouth nightly for 14 days. Indications: MIXED BIPOLAR I DISORDER  Qty: 14 Tab, Refills: 0       !! - Potential duplicate medications found. Please discuss with provider. CONTINUE these medications which have NOT CHANGED    Details   amLODIPine (NORVASC) 5 mg tablet Take 5 mg by mouth daily. !! divalproex DR (DEPAKOTE) 500 mg tablet Take 500 mg by mouth two (2) times a day. !! lithium carbonate 300 mg capsule Take 300 mg by mouth daily. !! lithium carbonate 600 mg capsule Take 600 mg by mouth nightly.        !! - Potential duplicate medications found. Please discuss with provider. Unresulted Labs     None        To obtain results of studies pending at discharge, please contact N/A. Follow-up Information     Follow up With Details Comments [id number] Shane Carrion, [place]  Medication management appointment on February 21st at 10:00 AM.  HERMILO BORDEN [facility]  [address], [address] [place] [address]   [phone]  Fax: [phone]  Emergency services: [phone]      None   None (690) Patient stated that they have no PCP              Advanced Directive:   Does the patient have an appointed surrogate decision maker? Unknown   Does the patient have a Medical Advance Directive? Unknown   Does the patient have a Psychiatric Advance Directive? Unknown   If the patient does not have a surrogate or Medical Advance Directive AND Psychiatric Advance Directive, the patient was offered information on these advance directives Unknown      Patient Instructions: Please continue all medications until otherwise directed by physician. Tobacco Cessation Discharge Plan:   Is the patient a smoker and needs referral for smoking cessation? No  Patient referred to the following for smoking cessation with an appointment? No   Patient was offered medication to assist with smoking cessation at discharge? No  Was education for smoking cessation added to the discharge instructions? No    Alcohol/Substance Abuse Discharge Plan:   Does the patient have a history of substance/alcohol abuse and requires a referral for treatment? Yes  Patient referred to the following for substance/alcohol abuse treatment with an appointment? Yes  Patient was offered medication to assist with alcohol cessation at discharge? Yes  Was education for substance/alcohol abuse added to discharge instructions? Yes    Patient discharged to Home; provided to the patient/caregiver either in hard copy or electronically.   Continuing care paperwork was faxed [facility] providers. Pt is alert and oriented. Pt denies SI/HI/AVH. Pt's mood was irritable with odd demeanor. Pt's thought process was linear and focused on discharge. Pt's insight and judgment are fair. Pt is in agreement with the plan.

## 2018-02-09 NOTE — BH NOTES
Pt will be transferred by [employer] [facility] [[address] at 4:30 PM - medicaid phone: [phone]  Confirmation number #[id number].

## 2018-02-09 NOTE — PROGRESS NOTES
Laboratory monitoring for mood stabilizer and antipsychotics:    Recommended baseline monitoring has not been completed based on this patient's current medication regimen. The following labs have been ordered to complete baseline monitoring: hemoglobin A1C, lipid panel, TSH     The patient is currently taking the following medication(s):   Current Facility-Administered Medications   Medication Dose Route Frequency    amLODIPine (NORVASC) tablet 5 mg  5 mg Oral DAILY    divalproex DR (DEPAKOTE) tablet 500 mg  500 mg Oral BID    lithium carbonate capsule 300 mg  300 mg Oral DAILY    lithium carbonate capsule 600 mg  600 mg Oral QHS    OLANZapine (ZyPREXA) tablet 10 mg  10 mg Oral QHS       Serum Drug Level(s)    LITHIUM  Lab Results   Component Value Date/Time    Lithium level 0.48 (L) 02/07/2018 04:46 PM     VALPROIC ACID  Recent Labs      02/07/18   1646   VALP  73         Height, Weight, BMI Estimation  Estimated body mass index is 33.82 kg/(m^2) as calculated from the following:    Height as of this encounter: 175.3 cm (69\"). Weight as of this encounter: 103.9 kg (229 lb). Renal Function, Hepatic Function and Chemistry  Estimated Creatinine Clearance: 166.5 mL/min (based on Cr of 0.82). Lab Results   Component Value Date/Time    Sodium 139 02/07/2018 04:46 PM    Potassium 3.8 02/07/2018 04:46 PM    Chloride 102 02/07/2018 04:46 PM    CO2 27 02/07/2018 04:46 PM    Anion gap 10 02/07/2018 04:46 PM    Glucose 151 (H) 02/07/2018 04:46 PM    BUN 7 02/07/2018 04:46 PM    Creatinine 0.82 02/07/2018 04:46 PM    BUN/Creatinine ratio 9 (L) 02/07/2018 04:46 PM    GFR est AA >60 02/07/2018 04:46 PM    GFR est non-AA >60 02/07/2018 04:46 PM    Calcium 9.1 02/07/2018 04:46 PM    ALT (SGPT) 30 02/07/2018 04:46 PM    AST (SGOT) 16 02/07/2018 04:46 PM    Alk.  phosphatase 70 02/07/2018 04:46 PM    Protein, total 7.6 02/07/2018 04:46 PM    Albumin 3.8 02/07/2018 04:46 PM    Globulin 3.8 02/07/2018 04:46 PM    A-G Ratio 1.0 (L) 02/07/2018 04:46 PM    Bilirubin, total 0.6 02/07/2018 04:46 PM       Lab Results   Component Value Date/Time    Glucose 151 (H) 02/07/2018 04:46 PM       Hematology  Lab Results   Component Value Date/Time    WBC 10.1 02/07/2018 04:46 PM    HGB 14.2 02/07/2018 04:46 PM    HCT 41.2 02/07/2018 04:46 PM    PLATELET 069 44/87/2034 04:46 PM    MCV 84.1 02/07/2018 04:46 PM       Vitals  Visit Vitals    BP 93/55    Pulse (!) 52    Temp 96.8 °F (36 °C)    Resp 20    Ht 175.3 cm (69\")    Wt 103.9 kg (229 lb)    BMI 33.82 kg/m2     Marii Rubio, PharmD, BCPS  [phone] (pharmacy)

## 2018-02-09 NOTE — DISCHARGE INSTRUCTIONS
DISCHARGE SUMMARY from Nurse    PATIENT INSTRUCTIONS:  What to do at Home:  Recommended activity: Activity as tolerated  *  Please give a list of your current medications to your Primary Care Provider. *  Please update this list whenever your medications are discontinued, doses are      changed, or new medications (including over-the-counter products) are added. *  Please carry medication information at all times in case of emergency situations. These are general instructions for a healthy lifestyle:    No smoking/ No tobacco products/ Avoid exposure to second hand smoke  Surgeon General's Warning:  Quitting smoking now greatly reduces serious risk to your health. Obesity, smoking, and sedentary lifestyle greatly increases your risk for illness    A healthy diet, regular physical exercise & weight monitoring are important for maintaining a healthy lifestyle  The discharge information has been reviewed with the patient. The patient verbalized understanding. Discharge medications reviewed with the patient and appropriate educational materials and side effects teaching were provided. ___________________________________________________________________________________________________________________________________  . If I feel I am at risk of hurting myself or others, I will call the crisis office and speak with a crisis worker who will assist me during my crisis. [facility] [address]  [phone]  [address] [place] [address] [phone]  [address]-  [phone]

## 2018-02-09 NOTE — BH NOTES
GROUP THERAPY PROGRESS NOTE    The patient Tay Enciso is participating in [employer]. Group time: 30 minutes    Personal goal for participation: to orient the patient to the unit.     Goal orientation: successful adoption of unit rules    Group therapy participation: minimal    Therapeutic interventions reviewed and discussed: Yes    Impression of participation:real Piper  2/9/2018 8:12 AM
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
## 2018-02-09 NOTE — BH NOTES
Client has been discharged from the unit, in good spirits, pleased to be going. States his ride is all the way to his home in [place]. Given belongings here on the unit and to be met by security with belongings that were in the safe. Walked to front door and cab.

## 2018-02-09 NOTE — PROGRESS NOTES
Problem: Aggression and Hostility (Behavioral Health)  Goal: *Take prescribed medications consistently with supervision  Outcome: Progressing Towards Goal  Patient alert and oriented. Positive for auditory hallucinations. Appears to be responding to internal stimuli. Meal and medication complaint. Participatory in groups and activities. Remains focused on discharge. Continues on q15 minute safety checks. Will continue to monitor and continue plan of care.

## 2018-02-09 NOTE — BH NOTES
Pt rested quietly in bed with eyes closed. No signs/symptoms of distress, agitation, or anxiety. Will monitor for changes. Q 15 minute checks continue.  Pt slept 7 hrs

## 2018-08-13 ENCOUNTER — ED HISTORICAL/CONVERTED ENCOUNTER (OUTPATIENT)
Dept: OTHER | Age: 23
End: 2018-08-13

## 2019-07-07 ENCOUNTER — HOSPITAL ENCOUNTER (INPATIENT)
Age: 24
LOS: 8 days | Discharge: HOME | DRG: 750 | End: 2019-07-15
Attending: PSYCHIATRY & NEUROLOGY | Admitting: PSYCHIATRY & NEUROLOGY
Payer: MEDICAID

## 2019-07-07 PROBLEM — F70 MILD INTELLECTUAL DISABILITY: Status: ACTIVE | Noted: 2019-07-07

## 2019-07-07 PROCEDURE — 65220000003 HC RM SEMIPRIVATE PSYCH

## 2019-07-07 PROCEDURE — 74011250637 HC RX REV CODE- 250/637: Performed by: PSYCHIATRY & NEUROLOGY

## 2019-07-07 RX ORDER — AMLODIPINE BESYLATE 5 MG/1
2.5 TABLET ORAL DAILY
Status: DISCONTINUED | OUTPATIENT
Start: 2019-07-07 | End: 2019-07-15 | Stop reason: HOSPADM

## 2019-07-07 RX ORDER — IBUPROFEN 400 MG/1
400 TABLET ORAL
Status: DISCONTINUED | OUTPATIENT
Start: 2019-07-07 | End: 2019-07-07

## 2019-07-07 RX ORDER — BENZTROPINE MESYLATE 1 MG/ML
2 INJECTION INTRAMUSCULAR; INTRAVENOUS
Status: DISCONTINUED | OUTPATIENT
Start: 2019-07-07 | End: 2019-07-15 | Stop reason: HOSPADM

## 2019-07-07 RX ORDER — OLANZAPINE 5 MG/1
5 TABLET ORAL
Status: DISCONTINUED | OUTPATIENT
Start: 2019-07-07 | End: 2019-07-08

## 2019-07-07 RX ORDER — ADHESIVE BANDAGE
30 BANDAGE TOPICAL DAILY PRN
Status: DISCONTINUED | OUTPATIENT
Start: 2019-07-07 | End: 2019-07-15 | Stop reason: HOSPADM

## 2019-07-07 RX ORDER — BENZTROPINE MESYLATE 2 MG/1
2 TABLET ORAL
Status: DISCONTINUED | OUTPATIENT
Start: 2019-07-07 | End: 2019-07-15 | Stop reason: HOSPADM

## 2019-07-07 RX ORDER — LITHIUM CARBONATE 450 MG/1
450 TABLET ORAL 2 TIMES DAILY
Status: DISCONTINUED | OUTPATIENT
Start: 2019-07-07 | End: 2019-07-15 | Stop reason: HOSPADM

## 2019-07-07 RX ORDER — LORAZEPAM 2 MG/ML
2 INJECTION INTRAMUSCULAR
Status: DISCONTINUED | OUTPATIENT
Start: 2019-07-07 | End: 2019-07-15 | Stop reason: HOSPADM

## 2019-07-07 RX ORDER — HYDROXYZINE 25 MG/1
50 TABLET, FILM COATED ORAL
Status: DISCONTINUED | OUTPATIENT
Start: 2019-07-07 | End: 2019-07-15 | Stop reason: HOSPADM

## 2019-07-07 RX ORDER — ACETAMINOPHEN 325 MG/1
650 TABLET ORAL
Status: DISCONTINUED | OUTPATIENT
Start: 2019-07-07 | End: 2019-07-15 | Stop reason: HOSPADM

## 2019-07-07 RX ORDER — IBUPROFEN 200 MG
1 TABLET ORAL
Status: DISCONTINUED | OUTPATIENT
Start: 2019-07-07 | End: 2019-07-15 | Stop reason: HOSPADM

## 2019-07-07 RX ADMIN — AMLODIPINE BESYLATE 2.5 MG: 5 TABLET ORAL at 07:29

## 2019-07-07 RX ADMIN — ARIPIPRAZOLE 20 MG: 15 TABLET ORAL at 07:29

## 2019-07-07 RX ADMIN — LITHIUM CARBONATE 450 MG: 450 TABLET, EXTENDED RELEASE ORAL at 07:29

## 2019-07-07 RX ADMIN — TRAZODONE HYDROCHLORIDE 150 MG: 100 TABLET ORAL at 21:20

## 2019-07-07 RX ADMIN — OLANZAPINE 5 MG: 5 TABLET, FILM COATED ORAL at 17:13

## 2019-07-07 RX ADMIN — IBUPROFEN 400 MG: 400 TABLET ORAL at 07:29

## 2019-07-07 RX ADMIN — HYDROXYZINE HYDROCHLORIDE 50 MG: 25 TABLET, FILM COATED ORAL at 12:11

## 2019-07-07 RX ADMIN — LITHIUM CARBONATE 450 MG: 450 TABLET, EXTENDED RELEASE ORAL at 17:06

## 2019-07-07 RX ADMIN — ACETAMINOPHEN 650 MG: 325 TABLET, FILM COATED ORAL at 14:20
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
## 2019-07-07 NOTE — CONSULTS
Hospitalist Progress Note  Elisa Avery MD  Answering service: [phone] from in house phone  Cell: [phone]      Date of Service:  2019 0  NAME:  Larry La  :  1995  MRN:  [id number]  Admission date: 2019  4:51 AM    Admission Summary:   Larry La 24 y.o. male with h/o intermittent asthma admitted with worsened depression and suicidal ideation. Interval history / Subjective:     No acute complains. Feels suicidal.  Denies wheezing, SOB. States that he takes low dose amlodipine at home. No chest pain, abdominal pain, dysuria   SHX: non smoker  Assessment & Plan:   Depression with suicidal thoughts  -Management per psychiatry  Intermittent asthma  -Albuterol as needed  Questionable HTN. Borderline hypotensive  -Hold amlodipine and restart if SBP>140 on more then 3 occasions     Vital Signs:   118/60    Physical Examination:         Constitutional:  No acute distress, cooperative, pleasant    Resp:  CTA bilaterally. No wheezing/rhonchi/rales. No accessory muscle use   CV:  Regular rhythm, normal rate, no murmurs, gallops, rubs    GI:  Soft, non distended, non tender. normoactive bowel sounds, no hepatosplenomegaly      Data Review:                  Elisa Avery MD   Will sign off. Please, call with questions.

## 2019-07-07 NOTE — BH NOTES
05:06 - Patient arrived to unit accompanied by security. Patient's belongings were searched. Patient's skin was assessed. Patient has acne on face. No other lesions or abrasions were noted. Patient endorses SI with no plan and auditory hallucinations. Patient denies HI and visual hallucinations. Patient is sad and tearful at times during assessment. Patient states the voices tell him he is worthless. He states he is a diabetic but there is no medication listed for it. His blood sugar was 110 in the ER. Patient has a history of saying he is suicidal in order to get his way. Patient was oriented to the unit and then he went to bed and went to sleep.

## 2019-07-07 NOTE — PROGRESS NOTES
Pharmacy Progress Note    Due to patient being initiated on lithium, the Behavioral Health Admission Protocol ibuprofen 400 mg PO every 8 hours as needed order has been automatically discontinued per P&T approved protocol. There is a severe drug-drug interaction between lithium and non-steroidal antiinflammatory drugs (I.e. Ibuprofen) that can result a 50-60% increase in lithium concentrations thereby increasing the risk for lithium toxicity.      Thank you,  Edwardo Stone, PHARMD, BCPS

## 2019-07-07 NOTE — BH NOTES
0700 Assumed care of the patient    0730 PRN ibuprofen given for complaints of toothache    0830 Medication and meal compliant. Alert and verbal. Patient tearful. States remains with SI. Agreeable to contract for safety on the unit. States has AH. Voices tell him he should be better off dead. 1000 In room resting quietly    1130 Actively participated in treatment team    1215 PRN atarax given for complaints of anxiety. 1330 Patient resting quietly     1430 Medicated with prn tylenol for headache    1515 Headache resolved    1720 Present in dayroom for dinner. Tearful. States in agitated. Conts with intrusive thoughts and SI. Remains agreeable to contract for safety. PRN zyprexa given. 1830 Returned to room. States is feeling better at the moment.

## 2019-07-08 PROBLEM — F25.9 SCHIZOAFFECTIVE DISORDER (HCC): Status: ACTIVE | Noted: 2019-07-08

## 2019-07-08 LAB
ALBUMIN SERPL-MCNC: 3.9 G/DL (ref 3.5–5)
ALBUMIN/GLOB SERPL: 1.3 {RATIO} (ref 1.1–2.2)
ALP SERPL-CCNC: 78 U/L (ref 45–117)
ALT SERPL-CCNC: 55 U/L (ref 12–78)
ANION GAP SERPL CALC-SCNC: 8 MMOL/L (ref 5–15)
AST SERPL-CCNC: 20 U/L (ref 15–37)
BILIRUB SERPL-MCNC: 1 MG/DL (ref 0.2–1)
BUN SERPL-MCNC: 11 MG/DL (ref 6–20)
BUN/CREAT SERPL: 14 (ref 12–20)
CALCIUM SERPL-MCNC: 9.4 MG/DL (ref 8.5–10.1)
CHLORIDE SERPL-SCNC: 106 MMOL/L (ref 97–108)
CHOLEST SERPL-MCNC: 150 MG/DL
CO2 SERPL-SCNC: 27 MMOL/L (ref 21–32)
CREAT SERPL-MCNC: 0.77 MG/DL (ref 0.7–1.3)
ERYTHROCYTE [DISTWIDTH] IN BLOOD BY AUTOMATED COUNT: 12.6 % (ref 11.5–14.5)
GLOBULIN SER CALC-MCNC: 3.1 G/DL (ref 2–4)
GLUCOSE SERPL-MCNC: 88 MG/DL (ref 65–100)
HCT VFR BLD AUTO: 45.5 % (ref 36.6–50.3)
HDLC SERPL-MCNC: 29 MG/DL
HDLC SERPL: 5.2 {RATIO} (ref 0–5)
HGB BLD-MCNC: 15.1 G/DL (ref 12.1–17)
LDLC SERPL CALC-MCNC: 91.4 MG/DL (ref 0–100)
LIPID PROFILE,FLP: ABNORMAL
MCH RBC QN AUTO: 27.8 PG (ref 26–34)
MCHC RBC AUTO-ENTMCNC: 33.2 G/DL (ref 30–36.5)
MCV RBC AUTO: 83.8 FL (ref 80–99)
NRBC # BLD: 0 K/UL (ref 0–0.01)
NRBC BLD-RTO: 0 PER 100 WBC
PLATELET # BLD AUTO: 308 K/UL (ref 150–400)
PMV BLD AUTO: 10.8 FL (ref 8.9–12.9)
POTASSIUM SERPL-SCNC: 4.5 MMOL/L (ref 3.5–5.1)
PROT SERPL-MCNC: 7 G/DL (ref 6.4–8.2)
RBC # BLD AUTO: 5.43 M/UL (ref 4.1–5.7)
SODIUM SERPL-SCNC: 141 MMOL/L (ref 136–145)
TRIGL SERPL-MCNC: 148 MG/DL (ref ?–150)
TSH SERPL DL<=0.05 MIU/L-ACNC: 1.19 UIU/ML (ref 0.36–3.74)
VLDLC SERPL CALC-MCNC: 29.6 MG/DL
WBC # BLD AUTO: 8.9 K/UL (ref 4.1–11.1)

## 2019-07-08 PROCEDURE — 36415 COLL VENOUS BLD VENIPUNCTURE: CPT

## 2019-07-08 PROCEDURE — 80061 LIPID PANEL: CPT

## 2019-07-08 PROCEDURE — 74011250637 HC RX REV CODE- 250/637: Performed by: PSYCHIATRY & NEUROLOGY

## 2019-07-08 PROCEDURE — 84443 ASSAY THYROID STIM HORMONE: CPT

## 2019-07-08 PROCEDURE — 80053 COMPREHEN METABOLIC PANEL: CPT

## 2019-07-08 PROCEDURE — 85027 COMPLETE CBC AUTOMATED: CPT

## 2019-07-08 PROCEDURE — 65220000003 HC RM SEMIPRIVATE PSYCH

## 2019-07-08 RX ORDER — LITHIUM CARBONATE 450 MG/1
450 TABLET ORAL 2 TIMES DAILY
COMMUNITY

## 2019-07-08 RX ORDER — CHLORPROMAZINE HYDROCHLORIDE 25 MG/ML
50 INJECTION INTRAMUSCULAR
Status: DISCONTINUED | OUTPATIENT
Start: 2019-07-08 | End: 2019-07-15 | Stop reason: HOSPADM

## 2019-07-08 RX ORDER — TRAZODONE HYDROCHLORIDE 150 MG/1
150 TABLET ORAL
COMMUNITY

## 2019-07-08 RX ORDER — BENZOYL PEROXIDE 100 MG/ML
LIQUID TOPICAL DAILY
COMMUNITY

## 2019-07-08 RX ORDER — FLUTICASONE PROPIONATE 50 MCG
2 SPRAY, SUSPENSION (ML) NASAL
COMMUNITY

## 2019-07-08 RX ORDER — CHLORPROMAZINE HYDROCHLORIDE 50 MG/1
50 TABLET, FILM COATED ORAL
Status: DISCONTINUED | OUTPATIENT
Start: 2019-07-08 | End: 2019-07-15 | Stop reason: HOSPADM

## 2019-07-08 RX ORDER — HYDROXYZINE PAMOATE 50 MG/1
50 CAPSULE ORAL
COMMUNITY

## 2019-07-08 RX ORDER — ALBUTEROL SULFATE 90 UG/1
2 AEROSOL, METERED RESPIRATORY (INHALATION)
COMMUNITY

## 2019-07-08 RX ORDER — DIPHENHYDRAMINE HYDROCHLORIDE 50 MG/ML
25 INJECTION, SOLUTION INTRAMUSCULAR; INTRAVENOUS
Status: DISCONTINUED | OUTPATIENT
Start: 2019-07-08 | End: 2019-07-15 | Stop reason: HOSPADM

## 2019-07-08 RX ORDER — ARIPIPRAZOLE 15 MG/1
30 TABLET ORAL DAILY
Status: DISCONTINUED | OUTPATIENT
Start: 2019-07-09 | End: 2019-07-15 | Stop reason: HOSPADM

## 2019-07-08 RX ORDER — ARIPIPRAZOLE 30 MG/1
30 TABLET ORAL DAILY
Status: ON HOLD | COMMUNITY
End: 2019-07-15 | Stop reason: SDUPTHER

## 2019-07-08 RX ORDER — AMLODIPINE BESYLATE 2.5 MG/1
2.5 TABLET ORAL DAILY
COMMUNITY

## 2019-07-08 RX ADMIN — LITHIUM CARBONATE 450 MG: 450 TABLET, EXTENDED RELEASE ORAL at 16:36

## 2019-07-08 RX ADMIN — ARIPIPRAZOLE 20 MG: 15 TABLET ORAL at 08:23

## 2019-07-08 RX ADMIN — ACETAMINOPHEN 650 MG: 325 TABLET, FILM COATED ORAL at 16:36

## 2019-07-08 RX ADMIN — AMLODIPINE BESYLATE 2.5 MG: 5 TABLET ORAL at 08:23

## 2019-07-08 RX ADMIN — LITHIUM CARBONATE 450 MG: 450 TABLET, EXTENDED RELEASE ORAL at 08:23

## 2019-07-08 RX ADMIN — HYDROXYZINE HYDROCHLORIDE 50 MG: 25 TABLET, FILM COATED ORAL at 12:19

## 2019-07-08 RX ADMIN — TRAZODONE HYDROCHLORIDE 150 MG: 100 TABLET ORAL at 21:41

## 2019-07-08 NOTE — PROGRESS NOTES
Problem: Discharge Planning  Goal: *Knowledge of medication management  Outcome: Progressing Towards Goal  Note:   Patient verbalizes understanding of medication regimen. Patient agrees to take medications as prescribed. Goal: *Knowledge of discharge instructions  Outcome: Progressing Towards Goal  Note:   Patient verbalizes understanding of goals for treatment and safe discharge. Problem: Discharge Planning  Goal: *Discharge to safe environment  Outcome: Not Progressing Towards Goal  Note:   Patient does not identify [facility] as a safe discharge plan. Patient is welcome to return to [facility]. Patient does not identify an alternative discharge option.

## 2019-07-08 NOTE — BH NOTES
RAUL left voicemails for Honor Poster,  with [facility] ([phone]; [phone]) requesting call back to discuss Pt admission and discharge planning.

## 2019-07-08 NOTE — BH NOTES
0700 Assumed care of the patient    0830 Medication and meal compliant. Alert and verbal. Denies HI. States SI and AH remains, however, pt agreeable to contract for safety. Tearful at times. 1000 Patient visible on unit. Interacts well with staff, however, repeatedly asks the same questions    1200 Present in dayroom for group. Actively participated in treatment team    200 Patient tearful. Complains of anxiety. PRN atarax given     1400 Anxiety resolved    1500 Actively participating in group    1640 PRN tylenol given for toothache    1730 Pain resolved.  Present in dayroom for dinner    1800 In room resting quietly      1330 P

## 2019-07-08 NOTE — BH NOTES
Patient is alert and cooperative. He began the evening with numerous somatic complaints ranging from toothaches, headaches, and having anxiety. Writer explained to patient he received all available prn meds for anxiety and pain and offered to work with him on developing some different coping skills. Patient agreed to coloring some preprinted drawing to help with his anxiety. Patient denies he is currently having thoughts of SI or HI. He denies any auditory or visual hallucinations. Q 15 minute safety checks continue.  Jagjit Walton BSN, RN

## 2019-07-08 NOTE — BH NOTES
PSYCHOSOCIAL ASSESSMENT  :Patient identifying info: Katya Manning is a 24 y.o., male admitted 7/7/2019  4:51 AM     Presenting problem and precipitating factors: Patient was transferred to [facility] from [facility] due to being unhappy at his intermediate. Pt was initially discharged from the ED, but returned under an ECO when he made statements of SI/HI without a plan at a [facility] and police were called. Pt has no history of self-harm or suicidal behaviors. Of note, Pt also suffers from intellectual disability, borderline personality disorder, and verbalizes that he does not have good coping skills when enduring stressful situations and poor impulse control. Pt reports he feels invalidated by the staff at the group home. He also reports he does not feel his medications are working. Mental status assessment: Alert, oriented, calm, cooperative, intellectually delayed    Strengths: Engaged with outpatient support team; seeks help    Collateral information: Jackeline Soumyaroel (next of kin [phone])    Current psychiatric /substance abuse providers and contact info: Bernarda Sullivan ([address]); [facility]; [facility]    Previous psychiatric/substance abuse providers and response to treatment: Multiple previous inpatient psychiatric hospitalizations [facility] 2/2018), other hospitals out of the area    Family history of mental illness or substance abuse: Unknown    Substance abuse history:  None  Social History     Tobacco Use    Smoking status: Current Every Day Smoker    Smokeless tobacco: Never Used   Substance Use Topics    Alcohol use: No       History of biomedical complications associated with substance abuse: N/A    Patient's current acceptance of treatment or motivation for change: Poor, due to intellectual disability and limited coping skills    Family constellation: Unknown    Is significant other involved?   No      Describe support system: Bernarda Castro ()    Describe living arrangements and home environment: Patient has been living at [facility] in [place], [place]. Pt reports he does not want to return because he feels mistreated by staff and uncomfortable with the other residents there.     Health issues:   Hospital Problems  Never Reviewed          Codes Class Noted POA    Schizoaffective disorder ([facility] ICD-10-CM: F25.9  ICD-9-CM: 295.70  2019 Unknown        Mild intellectual disability ICD-10-CM: F70  ICD-9-CM: 566  2019 Unknown        Borderline personality disorder ([facility] ICD-10-CM: F60.3  ICD-9-CM: 301.83  2018 Yes              Trauma history: Long history of serious mental illness, intellectual disability, and attachment issues    Legal issues: None indicated    History of  service: No    Financial status: SSDI    Jew/cultural factors: Latter day    Education/work history: Unemployed on disability    Have you been licensed as a health care professional (current or ): No    Leisure and recreation preferences:     Describe coping skills: Limited    Faustino Bentley  2019

## 2019-07-08 NOTE — BH NOTES
Patient was seen by the [facility] for a Mary Salcedo TDO and was committed.

## 2019-07-08 NOTE — PROGRESS NOTES
[facility] Admission Pharmacy Medication Reconciliation     The following changes were made to the PTA medication list:   Additions:   Benzoyl peroxide  Mineral tar shampoo  Fluticasone nasal spray   Aripiparzole  Trazodone  Albuterol  Hydroxyzine pamoate  Modifications:   Amlodipine dose   Lithium dose  Deletions:   Divalproex     Total Time Spent: 20 minutes    Information obtained from: Medication list from [facility] in [facility] ([phone]), RxQuery    Patient allergies: Allergies as of 2019 - Review Complete 2018   Allergen Reaction Noted    Amoxicillin Angioedema 2018    Fish containing products Angioedema 2018    Penicillin g Angioedema 2018       Prior to Admission Medications   Prescriptions Last Dose Informant Patient Reported? Taking? ARIPiprazole (ABILIFY) 30 mg tablet   Yes Yes   Sig: Take 30 mg by mouth daily. albuterol (PROVENTIL HFA, VENTOLIN HFA, PROAIR HFA) 90 mcg/actuation inhaler   Yes Yes   Sig: Take 2 Puffs by inhalation every six (6) hours as needed for Wheezing or Shortness of Breath. amLODIPine (NORVASC) 2.5 mg tablet   Yes Yes   Sig: Take 2.5 mg by mouth daily. Indications: high blood pressure   benzoyl peroxide 10 % clsr topical cleanser   Yes Yes   Sig: Apply  to affected area daily. Indications: acne   coal tar (NEUTROGENA T-GEL) 0.5 % shampoo   Yes Yes   Sig: Apply  to affected area every Monday. fluticasone propionate (FLONASE ALLERGY RELIEF) 50 mcg/actuation nasal spray   Yes Yes   Si Sprays by Both Nostrils route nightly. hydrOXYzine pamoate (VISTARIL) 50 mg capsule   Yes Yes   Sig: Take 50 mg by mouth every four (4) hours as needed for Anxiety. lithium carbonate CR (ESKALITH CR) 450 mg CR tablet   Yes Yes   Sig: Take 450 mg by mouth two (2) times a day. traZODone (DESYREL) 150 mg tablet   Yes Yes   Sig: Take 150 mg by mouth nightly as needed for Sleep.       Facility-Administered Medications: None       Thank Mark miranda, [facility]  Desk: [phone] ([id number])  Pharmacy: [phone] ([id number])

## 2019-07-08 NOTE — PROGRESS NOTES
Spiritual Care Assessment/Progress Note  Jose Raul Gastelum      NAME: Wojciech Cortez      MRN: [id number]  AGE: 24 y.o.  SEX: male  Oriental orthodox Affiliation: Restoration   Language: English     7/8/2019     Total Time (in minutes): 12     Spiritual Assessment begun in [address] through conversation with:         [x]Patient        [] Family    [] Friend(s)        Reason for Consult: Initial/Spiritual assessment, patient floor, Request by patient     Spiritual beliefs: (Please include comment if needed)     [x] Identifies with a alexis tradition:         [] Supported by a alexis community:            [] Claims no spiritual orientation:           [] Seeking spiritual identity:                [] Adheres to an individual form of spirituality:           [] Not able to assess:                           Identified resources for coping:      [x] Prayer                               [] Music                  [] Guided Imagery     [] Family/friends                 [] Pet visits     [] Devotional reading                         [] Unknown     [] Other:                                             Interventions offered during this visit: (See comments for more details)    Patient Interventions: Initial/Spiritual assessment, Critical care, Affirmation of alexis, Affirmation of emotions/emotional suffering, Prayer (assurance of), Prayer (actual), Iconic (affirming the presence of God/Higher Power)           Plan of Care:     [x] Support spiritual and/or cultural needs    [] Support AMD and/or advance care planning process      [] Support grieving process   [] Coordinate Rites and/or Rituals    [] Coordination with community clergy   [x] No spiritual needs identified at this time   [] Detailed Plan of Care below (See Comments)  [] Make referral to Music Therapy  [] Make referral to Pet Therapy     [] Make referral to Addiction services  [] Make referral to [facility]  [] Make referral to Spiritual Care Partner  [] No future visits requested        [x] Follow up visits as needed     Comments: The patient met with me in the lower end of the hallway where there was not foot traffic or occupied rooms. The patient sat adjacent to me and I asked the patient to tell me his name. The patient did so and I introduced myself to the patient as well. I asked the patient to tell what was on his mind. The patient began his story by informing that he lives in [place] and that it was a long ride to [facility]. The patient complained how he felt mistreated in the hospital he was in previously, and that he refused to get in the car with his wife. The patient then stated that  He walked a great distance and told the  in a store to call the police because he was going to injure himself. He then explained that the police TDO'd him and now he is here. The patient stated that he would like to have prayer. We pray together. I explained to the patient that spiritual care will continue to be available to him. Rev.  Elise Herndon MDiv   For  Assistance Page [phone])

## 2019-07-08 NOTE — BH NOTES
Psychiatric Progress Note    Patient: Yeni Conrad MRN: [id number]  SSN: [id number]    YOB: 1995  Age: 24 y.o. Sex: male      Admit Date: 7/7/2019    LOS: 1 day     Subjective: Yeni Conrad  reports feeling depressed and dissatisfied with his living arrangements. Was tearful during interview. Recalled this interviewer from past hospitalizations. Denies HI/AH/VH. Still has thoughts to cut himself with a sharp object. Contracts for safety in the hospital.  No aggression or violence. Appropriately interactive and aware. Tolerating medications well. Eating well and sleeping fairly. Connected to [employer].     Objective:     Vitals:    07/07/19 0745 07/07/19 2002 07/08/19 0200 07/08/19 0750   BP: 118/75 119/75  124/62   Pulse: (!) 57 (!) 57  70   Resp: 16 16  16   Temp: 98 °F (36.7 °C) 97.5 °F (36.4 °C)  97.8 °F (36.6 °C)   SpO2: 100% 99%  (!) 70%   Weight:   106.6 kg (234 lb 15.8 oz)    Height:   6' (1.829 m)         Mental Status Exam:   Sensorium  oriented to time, place and person   Orientation situation   Relations cooperative   Eye Contact appropriate   Appearance:  age appropriate   Motor Behavior:  within normal limits   Speech:  normal volume and non-pressured   Vocabulary average   Thought Process: goal directed, concrete   Thought Content Simple   Suicidal ideations contracts for safety   Homicidal ideations none   Mood:  depressed   Affect:  constricted, depressed and tearful   Memory recent  adequate   Memory remote:  adequate   Concentration:  adequate   Abstraction:  concrete   Insight:  fair and limited   Reliability fair   Judgment:  age appropriate       MEDICATIONS:  Current Facility-Administered Medications   Medication Dose Route Frequency    ziprasidone (GEODON) 20 mg in sterile water (preservative free) 1 mL injection  20 mg IntraMUSCular BID PRN    OLANZapine (ZyPREXA) tablet 5 mg  5 mg Oral Q6H PRN    benztropine (COGENTIN) tablet 2 mg  2 mg Oral BID PRN    benztropine (COGENTIN) injection 2 mg  2 mg IntraMUSCular BID PRN    LORazepam (ATIVAN) injection 2 mg  2 mg IntraMUSCular Q4H PRN    acetaminophen (TYLENOL) tablet 650 mg  650 mg Oral Q4H PRN    magnesium hydroxide (MILK OF MAGNESIA) 400 mg/5 mL oral suspension 30 mL  30 mL Oral DAILY PRN    nicotine (NICODERM CQ) 21 mg/24 hr patch 1 Patch  1 Patch TransDERmal DAILY PRN    lithium carbonate CR (ESKALITH CR) tablet 450 mg  450 mg Oral BID    traZODone (DESYREL) tablet 150 mg  150 mg Oral QHS    ARIPiprazole (ABILIFY) tablet 20 mg  20 mg Oral DAILY    hydrOXYzine HCl (ATARAX) tablet 50 mg  50 mg Oral Q4H PRN    amLODIPine (NORVASC) tablet 2.5 mg  2.5 mg Oral DAILY      DISCUSSION:   the risks and benefits of the proposed medication  patient given opportunity to ask questions    Lab/Data Review: All lab results for the last 24 hours reviewed.      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

## 2019-07-09 PROCEDURE — 74011250637 HC RX REV CODE- 250/637: Performed by: PSYCHIATRY & NEUROLOGY

## 2019-07-09 PROCEDURE — 65220000003 HC RM SEMIPRIVATE PSYCH

## 2019-07-09 RX ORDER — QUETIAPINE FUMARATE 25 MG/1
50 TABLET, FILM COATED ORAL
Status: DISCONTINUED | OUTPATIENT
Start: 2019-07-09 | End: 2019-07-15 | Stop reason: HOSPADM

## 2019-07-09 RX ADMIN — CHLORPROMAZINE HYDROCHLORIDE 50 MG: 50 TABLET, SUGAR COATED ORAL at 21:15

## 2019-07-09 RX ADMIN — AMLODIPINE BESYLATE 2.5 MG: 5 TABLET ORAL at 08:27

## 2019-07-09 RX ADMIN — TRAZODONE HYDROCHLORIDE 150 MG: 100 TABLET ORAL at 21:15

## 2019-07-09 RX ADMIN — LITHIUM CARBONATE 450 MG: 450 TABLET, EXTENDED RELEASE ORAL at 08:27

## 2019-07-09 RX ADMIN — ARIPIPRAZOLE 30 MG: 15 TABLET ORAL at 08:27

## 2019-07-09 RX ADMIN — CHLORPROMAZINE HYDROCHLORIDE 50 MG: 50 TABLET, SUGAR COATED ORAL at 12:47

## 2019-07-09 RX ADMIN — LITHIUM CARBONATE 450 MG: 450 TABLET, EXTENDED RELEASE ORAL at 16:46

## 2019-07-09 RX ADMIN — HYDROXYZINE HYDROCHLORIDE 50 MG: 25 TABLET, FILM COATED ORAL at 15:33

## 2019-07-09 RX ADMIN — HYDROXYZINE HYDROCHLORIDE 50 MG: 25 TABLET, FILM COATED ORAL at 08:28

## 2019-07-09 NOTE — PROGRESS NOTES
Laboratory monitoring for mood stabilizer and antipsychotics:    Recommended baseline monitoring has been completed based on this patient's current medication regimen. The following labs were completed at transferring facility: urinalysis, UDS (negative)     The patient is currently taking the following medication(s):   Current Facility-Administered Medications   Medication Dose Route Frequency    ARIPiprazole (ABILIFY) tablet 30 mg  30 mg Oral DAILY    lithium carbonate CR (ESKALITH CR) tablet 450 mg  450 mg Oral BID    traZODone (DESYREL) tablet 150 mg  150 mg Oral QHS    amLODIPine (NORVASC) tablet 2.5 mg  2.5 mg Oral DAILY       Height, Weight, BMI Estimation  Estimated body mass index is 31.87 kg/m² as calculated from the following:    Height as of this encounter: 182.9 cm (72\"). Weight as of this encounter: 106.6 kg (234 lb 15.8 oz). Renal Function, Hepatic Function and Chemistry  Estimated Creatinine Clearance: 186.6 mL/min (based on SCr of 0.77 mg/dL). Lab Results   Component Value Date/Time    Sodium 141 07/08/2019 05:34 AM    Potassium 4.5 07/08/2019 05:34 AM    Chloride 106 07/08/2019 05:34 AM    CO2 27 07/08/2019 05:34 AM    Anion gap 8 07/08/2019 05:34 AM    Glucose 88 07/08/2019 05:34 AM    BUN 11 07/08/2019 05:34 AM    Creatinine 0.77 07/08/2019 05:34 AM    BUN/Creatinine ratio 14 07/08/2019 05:34 AM    GFR est AA >60 07/08/2019 05:34 AM    GFR est non-AA >60 07/08/2019 05:34 AM    Calcium 9.4 07/08/2019 05:34 AM    ALT (SGPT) 55 07/08/2019 05:34 AM    AST (SGOT) 20 07/08/2019 05:34 AM    Alk.  phosphatase 78 07/08/2019 05:34 AM    Protein, total 7.0 07/08/2019 05:34 AM    Albumin 3.9 07/08/2019 05:34 AM    Globulin 3.1 07/08/2019 05:34 AM    A-G Ratio 1.3 07/08/2019 05:34 AM    Bilirubin, total 1.0 07/08/2019 05:34 AM       Lab Results   Component Value Date/Time    Glucose 88 07/08/2019 05:34 AM     Hematology  Lab Results   Component Value Date/Time    WBC 8.9 07/08/2019 05:34 AM    HGB 15.1 07/08/2019 05:34 AM    HCT 45.5 07/08/2019 05:34 AM    PLATELET 098 03/04/4624 05:34 AM    MCV 83.8 07/08/2019 05:34 AM       Lipids  Lab Results   Component Value Date/Time    Cholesterol, total 150 07/08/2019 05:34 AM    HDL Cholesterol 29 07/08/2019 05:34 AM    LDL, calculated 91.4 07/08/2019 05:34 AM    Triglyceride 148 07/08/2019 05:34 AM    CHOL/HDL Ratio 5.2 (H) 07/08/2019 05:34 AM       Thyroid Function    Lab Results   Component Value Date/Time    TSH 1.19 07/08/2019 05:34 AM     Vitals  Visit Vitals  /66   Pulse (!) 56   Temp 97.7 °F (36.5 °C)   Resp 12   Ht 182.9 cm (72\")   Wt 106.6 kg (234 lb 15.8 oz)   SpO2 99%   BMI 31.87 kg/m²       Ricky Copeland, PharmD, BCPS  [phone] (pharmacy)

## 2019-07-09 NOTE — BH NOTES
GROUP THERAPY PROGRESS NOTE    The patient Lelo Argueta a 24 y.o. male is participating in Creative Expression Group. Group time: 1 hour    Personal goal for participation: To concentrate on selected task    Goal orientation: social    Group therapy participation: active    Therapeutic interventions reviewed and discussed: Crafts, games, music    Impression of participation: The patient was attentive.     Soumya Corona  7/9/2019  6:24 PM

## 2019-07-09 NOTE — BH NOTES
Patient alert and verbal. Continues to endorse SI. States has HI towards staff at group home if he has to return. Anxious, with intrusive thoughts. Tearful off and on. PRN atarax given x2 for anxiety and PO thorazine given x1 for intrusive thoughts. Complains that voices are intensifying. PRN medications help. Visible on unit. Attends group. Meal compliant. Preoccupied with thoughts of finding alternative living situation. Q 15 minute checks continue for safety.

## 2019-07-09 NOTE — BH NOTES
GROUP THERAPY PROGRESS NOTE    The patient Yessica perez 24 y.o. male is participating in Coping Skills Group. Group time: 45 minutes    Personal goal for participation: To participate in mental health journey game    Goal orientation:  personal    Group therapy participation: active    Therapeutic interventions reviewed and discussed: choices in recovery    Impression of participation:  The patient was attentive.     Bethany Churchill  7/9/2019  5:54 PM

## 2019-07-09 NOTE — BH NOTES
Patient isolated to his room most of the evening. He denies any SI or HI. Continues with auditory hallucinations. Medication compliant. Q 15 minute safety checks continue.

## 2019-07-10 ENCOUNTER — HOSPITAL ENCOUNTER (OUTPATIENT)
Dept: GENERAL RADIOLOGY | Age: 24
Discharge: HOME | End: 2019-07-10
Attending: PSYCHIATRY & NEUROLOGY

## 2019-07-10 PROCEDURE — 74011250637 HC RX REV CODE- 250/637: Performed by: PSYCHIATRY & NEUROLOGY

## 2019-07-10 PROCEDURE — 73630 X-RAY EXAM OF FOOT: CPT

## 2019-07-10 PROCEDURE — 65220000003 HC RM SEMIPRIVATE PSYCH

## 2019-07-10 RX ADMIN — QUETIAPINE FUMARATE 50 MG: 25 TABLET ORAL at 21:12

## 2019-07-10 RX ADMIN — LITHIUM CARBONATE 450 MG: 450 TABLET, EXTENDED RELEASE ORAL at 09:02

## 2019-07-10 RX ADMIN — ACETAMINOPHEN 650 MG: 325 TABLET, FILM COATED ORAL at 21:12

## 2019-07-10 RX ADMIN — TRAZODONE HYDROCHLORIDE 150 MG: 100 TABLET ORAL at 21:11

## 2019-07-10 RX ADMIN — LITHIUM CARBONATE 450 MG: 450 TABLET, EXTENDED RELEASE ORAL at 16:22

## 2019-07-10 RX ADMIN — AMLODIPINE BESYLATE 2.5 MG: 5 TABLET ORAL at 09:02

## 2019-07-10 RX ADMIN — ARIPIPRAZOLE 30 MG: 15 TABLET ORAL at 09:02

## 2019-07-10 RX ADMIN — CHLORPROMAZINE HYDROCHLORIDE 50 MG: 50 TABLET, SUGAR COATED ORAL at 17:24

## 2019-07-10 NOTE — PROGRESS NOTES
10 [facility] RN  received report from [facility]. 0800 Pt in room quiet. 0900 Pt in hallway. Pt presents with a cooperative attitude, blunted affect, and depressed/sad mood. Pt endorses anxiety and depression 10/10. Pt reported having difficulty falling asleep. Pt expressed concerns about Seroquel. Pt is isolative to the room throughout most of the morning. Pt expressed concerns about housing. Pt ate 85% of his breakfast. Pt is med and meal compliant. 1141 Pt actively attending coping skills group. Pt interacting well with staff and peers. 65 Pt came to nursing station expressing concerns about a peer who called him \"a bad name. \" Pt stated visibly anxious. This writer encouraged the pt to use coping skills. This writer assured the pt that this writer would talk to the peer's nurse. 46 Pt actively participating in creative expression group. Pt requested to talk to the  and pt advocate. This writer notified the  of the pt's desire to talk to the pt advocate. 18 Pt stated \"I need help. \" Pt stated that he was hearing voices saying that he would be better off dead. Pt received prn 50 mg thorazine for psychosis. 200 Pt stated that he still hears voices. Prn thorazine non-effective.

## 2019-07-10 NOTE — BH NOTES
Social Work -Pt was seen in treatment team this morning. Pt is alert and oriented. Pt denies SI/HI. Pt's mood is depressed, affect is flat. Pt's insight and judgment is fair, reliability is fair. Social work department will continue to coordinate discharge plans. During rounds patient asked three questions. He asked if the patient advocate could be contacted specifically regarding the phone issues. He asked if he could go to the [address] [place] [address] for his discharge location and that he would go to any [facility]ACH house that was available. Lastly he asked that his  be contacted or the outcome of previous contacts. Patient advocate Unknown Clause contacted. Left voicemail regarding patient's concern and request to speak to her. 1:15pm: Spoke with Bernarda, . Patient is able to return to his [facility] and staff are going to separate the other residents that are causing the patient issue. Bernarda reports the home is willing to accept him back and that the home even has 1:1 staff that help keep the patient busy and focused during the day. Discussed patient's preference for [facility] but Bernarda reports patient typically will request to go other places to avoid being at home and has had multiple admissions and multiple [facility] placements.      Anabel Gusman [facility]

## 2019-07-10 NOTE — BH NOTES
GROUP THERAPY PROGRESS NOTE    The patient Katya perez 24 y.o. male is participating in Coping Skills Group. Group time: 45 minutes    Personal goal for participation: To participate in anger management dooyoo game    Goal orientation:  personal    Group therapy participation: active    Therapeutic interventions reviewed and discussed: things pertaining to anger    Impression of participation:  The patient was attentive.     Chelo Echavarria  7/10/2019  5:59 PM

## 2019-07-10 NOTE — BH NOTES
Patient was tearful at the start of the shift. He states he was frightened and was hearing voices. Staff was able to calm patient by decreasing external stimuli and having one on one conversation with him. Patient states he knows the voices would harm him, but he doesn't like them. He requested Thorazine at bedtime to decrease the hallucinations so he could sleep. He denies any current feelings of SI or HI. Focused on speaking with the  to find him a different group home to reside. Denies any visual hallucinations. Q 15 minute safety checks continue.

## 2019-07-10 NOTE — BH NOTES
Psychiatric Progress Note    Patient: Patricia Samson MRN: [id number]  SSN: [id number]    YOB: 1995  Age: 24 y.o. Sex: male      Admit Date: 7/7/2019    LOS: 3 days     Subjective: Patricia Samson  reports feeling depressed and dissatisfied with his living arrangements. Was tearful during interview. Recalled this interviewer from past hospitalizations. Denies HI/AH/VH. Still has thoughts to cut himself with a sharp object. Contracts for safety in the hospital.  No aggression or violence. Appropriately interactive and aware. Tolerating medications well. Eating well and sleeping fairly. Connected to [employer]. 7/9Rhonda Scanlon reports feeling alright on unit but continues to have thoughts of killing himself. Denies HI/AH/VH. No aggression or violence. Appropriately interactive and aware. Tolerating medications well. Eating fairly but is having trouble sleeping. Requesting a different sleep aid. 7/10- Reports poor sleep still with anxiety and fear from the command auditory hallucinations. Also notes foot pain from an injury a few weeks ago. Isolates to room. Concerned about his housing plan and the thoughts of his out patient case management on this subject. Tolerating medications well. Notes eating and sleeping well. Sensitive.     Objective:     Vitals:    07/08/19 0750 07/08/19 2050 07/09/19 0816 07/10/19 0900   BP: 124/62 125/74 127/66 129/83   Pulse: 70 (!) 56 (!) 56 99   Resp: 16 18 12 14   Temp: 97.8 °F (36.6 °C) 98 °F (36.7 °C) 97.7 °F (36.5 °C) 97.3 °F (36.3 °C)   SpO2: (!) 70% 99% 99% 98%   Weight:       Height:            Mental Status Exam:   Sensorium  oriented to time, place and person   Orientation situation   Relations cooperative   Eye Contact appropriate   Appearance:  age appropriate   Motor Behavior:  within normal limits   Speech:  normal volume and non-pressured   Vocabulary average   Thought Process: goal directed, concrete   Thought Content Simple   Suicidal ideations contracts for safety   Homicidal ideations none   Mood:  depressed   Affect:  constricted, depressed and tearful   Memory recent  adequate   Memory remote:  adequate   Concentration:  adequate   Abstraction:  concrete   Insight:  fair and limited   Reliability fair   Judgment:  age appropriate       MEDICATIONS:  Current Facility-Administered Medications   Medication Dose Route Frequency    QUEtiapine (SEROquel) tablet 50 mg  50 mg Oral QHS PRN    ARIPiprazole (ABILIFY) tablet 30 mg  30 mg Oral DAILY    chlorproMAZINE (THORAZINE) injection 50 mg  50 mg IntraMUSCular Q6H PRN    And    diphenhydrAMINE (BENADRYL) injection 25 mg  25 mg IntraMUSCular Q6H PRN    chlorproMAZINE (THORAZINE) tablet 50 mg  50 mg Oral Q6H PRN    benztropine (COGENTIN) tablet 2 mg  2 mg Oral BID PRN    benztropine (COGENTIN) injection 2 mg  2 mg IntraMUSCular BID PRN    LORazepam (ATIVAN) injection 2 mg  2 mg IntraMUSCular Q4H PRN    acetaminophen (TYLENOL) tablet 650 mg  650 mg Oral Q4H PRN    magnesium hydroxide (MILK OF MAGNESIA) 400 mg/5 mL oral suspension 30 mL  30 mL Oral DAILY PRN    nicotine (NICODERM CQ) 21 mg/24 hr patch 1 Patch  1 Patch TransDERmal DAILY PRN    lithium carbonate CR (ESKALITH CR) tablet 450 mg  450 mg Oral BID    traZODone (DESYREL) tablet 150 mg  150 mg Oral QHS    hydrOXYzine HCl (ATARAX) tablet 50 mg  50 mg Oral Q4H PRN    amLODIPine (NORVASC) tablet 2.5 mg  2.5 mg Oral DAILY      DISCUSSION:   the risks and benefits of the proposed medication  patient given opportunity to ask questions    Lab/Data Review: All lab results for the last 24 hours reviewed.      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

## 2019-07-11 LAB
DATE LAST DOSE: NORMAL
LITHIUM SERPL-SCNC: 0.62 MMOL/L (ref 0.6–1.2)
REPORTED DOSE,DOSE: NORMAL UNITS
REPORTED DOSE/TIME,TMG: NORMAL

## 2019-07-11 PROCEDURE — 36415 COLL VENOUS BLD VENIPUNCTURE: CPT

## 2019-07-11 PROCEDURE — 80178 ASSAY OF LITHIUM: CPT

## 2019-07-11 PROCEDURE — 74011250637 HC RX REV CODE- 250/637: Performed by: PSYCHIATRY & NEUROLOGY

## 2019-07-11 PROCEDURE — 74011250636 HC RX REV CODE- 250/636: Performed by: PSYCHIATRY & NEUROLOGY

## 2019-07-11 PROCEDURE — 65220000003 HC RM SEMIPRIVATE PSYCH

## 2019-07-11 RX ORDER — KETOCONAZOLE 20 MG/ML
SHAMPOO TOPICAL
Status: DISCONTINUED | OUTPATIENT
Start: 2019-07-11 | End: 2019-07-15 | Stop reason: HOSPADM

## 2019-07-11 RX ADMIN — DIPHENHYDRAMINE HYDROCHLORIDE 25 MG: 50 INJECTION INTRAMUSCULAR; INTRAVENOUS at 17:21

## 2019-07-11 RX ADMIN — KETOCONAZOLE: 20 SHAMPOO, SUSPENSION TOPICAL at 16:46

## 2019-07-11 RX ADMIN — CHLORPROMAZINE HYDROCHLORIDE 50 MG: 25 INJECTION INTRAMUSCULAR at 17:21

## 2019-07-11 RX ADMIN — LITHIUM CARBONATE 450 MG: 450 TABLET, EXTENDED RELEASE ORAL at 08:03

## 2019-07-11 RX ADMIN — ACETAMINOPHEN 650 MG: 325 TABLET, FILM COATED ORAL at 10:00

## 2019-07-11 RX ADMIN — ARIPIPRAZOLE 30 MG: 15 TABLET ORAL at 08:03

## 2019-07-11 RX ADMIN — CHLORPROMAZINE HYDROCHLORIDE 50 MG: 50 TABLET, SUGAR COATED ORAL at 10:00

## 2019-07-11 RX ADMIN — LITHIUM CARBONATE 450 MG: 450 TABLET, EXTENDED RELEASE ORAL at 16:46

## 2019-07-11 RX ADMIN — AMLODIPINE BESYLATE 2.5 MG: 5 TABLET ORAL at 08:07

## 2019-07-11 RX ADMIN — QUETIAPINE FUMARATE 50 MG: 25 TABLET ORAL at 20:04

## 2019-07-11 RX ADMIN — ACETAMINOPHEN 650 MG: 325 TABLET, FILM COATED ORAL at 20:04

## 2019-07-11 RX ADMIN — TRAZODONE HYDROCHLORIDE 150 MG: 100 TABLET ORAL at 20:04

## 2019-07-11 NOTE — BH NOTES
GROUP THERAPY PROGRESS NOTE    The patient Gracie perez 24 y.o. male is participating in Creative Expression Group. Group time: 1 hour    Personal goal for participation: To concentrate on selected task    Goal orientation: social    Group therapy participation: active    Therapeutic interventions reviewed and discussed: Crafts, games, music    Impression of participation: The patient was attentive.     Rob Jason  7/11/2019  6:39 PM

## 2019-07-11 NOTE — PROGRESS NOTES
Laboratory Monitoring for Lithium    This patient is currently prescribed the following medication(s):   Current Facility-Administered Medications   Medication Dose Route Frequency    ARIPiprazole (ABILIFY) tablet 30 mg  30 mg Oral DAILY    lithium carbonate CR (ESKALITH CR) tablet 450 mg  450 mg Oral BID    traZODone (DESYREL) tablet 150 mg  150 mg Oral QHS    amLODIPine (NORVASC) tablet 2.5 mg  2.5 mg Oral DAILY       The following labs have been completed for monitoring of lithium:    Lithium Serum Concentration  Lab Results   Component Value Date/Time    Lithium level 0.62 07/11/2019 05:17 AM         Renal Function and Chemistry  Lab Results   Component Value Date/Time    Sodium 141 07/08/2019 05:34 AM    Potassium 4.5 07/08/2019 05:34 AM    Chloride 106 07/08/2019 05:34 AM    CO2 27 07/08/2019 05:34 AM    Anion gap 8 07/08/2019 05:34 AM    BUN 11 07/08/2019 05:34 AM    Creatinine 0.77 07/08/2019 05:34 AM    BUN/Creatinine ratio 14 07/08/2019 05:34 AM       Assessment/Plan:  Lithium level drawn on 7/11/19 @ 5:17 AM is within therapeutic limits @ 0.62 mmol/L (range 0.6-1.2 mmol/L). Level was drawn approximately 13 hours post-dose and is reflective of steady-state concentration. Recommend to continue current dose.       Peri Mcgowan, PharmD, BCPS  [phone]

## 2019-07-11 NOTE — BH NOTES
Patient walking in the hallway on and off endorses depression and SI contracts for safety states he is not having hallucinations  2112 PRN seroquel was given. Tylenol was given for mouth pain.   hourly rounding done  Slept 9 hours

## 2019-07-11 NOTE — BH NOTES
GROUP THERAPY PROGRESS NOTE    The patient Gracie perez 24 y.o. male is participating in Coping Skills Group. Group time: 45 minutes    Personal goal for participation: To identify positive coping strategies a-z    Goal orientation:  personal    Group therapy participation: active    Therapeutic interventions reviewed and discussed: worksheet    Impression of participation:  The patient was attentive.     Alissa Courser [place]  7/11/2019  6:06 PM

## 2019-07-11 NOTE — BH NOTES
Psychiatric Progress Note    Patient: Jonah Ojeda MRN: [id number]  SSN: [id number]    YOB: 1995  Age: 24 y.o. Sex: male      Admit Date: 7/7/2019    LOS: 4 days     Subjective: Jonah Ojeda  reports feeling depressed and dissatisfied with his living arrangements. Was tearful during interview. Recalled this interviewer from past hospitalizations. Denies HI/AH/VH. Still has thoughts to cut himself with a sharp object. Contracts for safety in the hospital.  No aggression or violence. Appropriately interactive and aware. Tolerating medications well. Eating well and sleeping fairly. Connected to Naked. 7/9[place] Hy reports feeling alright on unit but continues to have thoughts of killing himself. Denies HI/AH/VH. No aggression or violence. Appropriately interactive and aware. Tolerating medications well. Eating fairly but is having trouble sleeping. Requesting a different sleep aid. 7/10- Reports poor sleep still with anxiety and fear from the command auditory hallucinations. Also notes foot pain from an injury a few weeks ago. Isolates to room. Concerned about his housing plan and the thoughts of his out patient case management on this subject. Tolerating medications well. Notes eating and sleeping well. Sensitive. 7/11- Reports tearfully that he would like to go to a REACH home when discharged to manage the time until he can be moved to another home. Intermittent SI with no plan in hospital.  Slept 9hrs. Lithium level 0.62. Foot continues to hurt, but X-ray demonstrated no fractures. ..just a little positioning vs hammertoe concerns on 3 toes. Felt threatened on the unit but was able to utilize nursing for supports. Command hallucinations continue periodically. Dry flakes of skin on face and in hair.     Objective:     Vitals:    07/09/19 0816 07/10/19 0900 07/10/19 2012 07/11/19 0800   BP: 127/66 129/83 146/87 110/64   Pulse: (!) 56 99 86 79 Resp: 12 14 18 18   Temp: 97.7 °F (36.5 °C) 97.3 °F (36.3 °C) 97.8 °F (36.6 °C) 97.6 °F (36.4 °C)   SpO2: 99% 98% 95% 97%   Weight:       Height:            Mental Status Exam:   Sensorium  oriented to time, place and person   Orientation situation   Relations cooperative   Eye Contact appropriate   Appearance:  age appropriate   Motor Behavior:  within normal limits   Speech:  normal volume and non-pressured   Vocabulary average   Thought Process: goal directed, concrete   Thought Content Simple   Suicidal ideations contracts for safety   Homicidal ideations none   Mood:  depressed   Affect:  constricted, depressed and tearful   Memory recent  adequate   Memory remote:  adequate   Concentration:  adequate   Abstraction:  concrete   Insight:  fair and limited   Reliability fair   Judgment:  age appropriate       MEDICATIONS:  Current Facility-Administered Medications   Medication Dose Route Frequency    QUEtiapine (SEROquel) tablet 50 mg  50 mg Oral QHS PRN    ARIPiprazole (ABILIFY) tablet 30 mg  30 mg Oral DAILY    chlorproMAZINE (THORAZINE) injection 50 mg  50 mg IntraMUSCular Q6H PRN    And    diphenhydrAMINE (BENADRYL) injection 25 mg  25 mg IntraMUSCular Q6H PRN    chlorproMAZINE (THORAZINE) tablet 50 mg  50 mg Oral Q6H PRN    benztropine (COGENTIN) tablet 2 mg  2 mg Oral BID PRN    benztropine (COGENTIN) injection 2 mg  2 mg IntraMUSCular BID PRN    LORazepam (ATIVAN) injection 2 mg  2 mg IntraMUSCular Q4H PRN    acetaminophen (TYLENOL) tablet 650 mg  650 mg Oral Q4H PRN    magnesium hydroxide (MILK OF MAGNESIA) 400 mg/5 mL oral suspension 30 mL  30 mL Oral DAILY PRN    nicotine (NICODERM CQ) 21 mg/24 hr patch 1 Patch  1 Patch TransDERmal DAILY PRN    lithium carbonate CR (ESKALITH CR) tablet 450 mg  450 mg Oral BID    traZODone (DESYREL) tablet 150 mg  150 mg Oral QHS    hydrOXYzine HCl (ATARAX) tablet 50 mg  50 mg Oral Q4H PRN    amLODIPine (NORVASC) tablet 2.5 mg  2.5 mg Oral DAILY DISCUSSION:   the risks and benefits of the proposed medication  patient given opportunity to ask questions    Lab/Data Review: All lab results for the last 24 hours reviewed.      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

## 2019-07-11 NOTE — BH NOTES
Report received from Cyndee Laird RN at 0700. Patient currently in hallway walking. Patient medication compliant with morning medications. Patient requested nurse be present when speaking with Dr. Karen Jalloh during treatment team.     1000 Patient requested medication for thoughts he was having to hurt himself or to hurt someone else (people in his group home). Patient given Thorazine 50mg. 1600  Patient pacing the halls with focus on discharge and living arrangements once he is discharged. Patient was ordered some ketoconazole shampoo for dandruff. 1754 Patient given Thorazine and Benadryl IM for \"thoughts\". Patient pleasant throughout shift.

## 2019-07-12 PROCEDURE — 74011250637 HC RX REV CODE- 250/637: Performed by: PSYCHIATRY & NEUROLOGY

## 2019-07-12 PROCEDURE — 74011250637 HC RX REV CODE- 250/637: Performed by: INTERNAL MEDICINE

## 2019-07-12 PROCEDURE — 65220000003 HC RM SEMIPRIVATE PSYCH

## 2019-07-12 RX ORDER — IBUPROFEN 400 MG/1
400 TABLET ORAL
Status: DISCONTINUED | OUTPATIENT
Start: 2019-07-12 | End: 2019-07-15 | Stop reason: HOSPADM

## 2019-07-12 RX ADMIN — AMLODIPINE BESYLATE 2.5 MG: 5 TABLET ORAL at 09:15

## 2019-07-12 RX ADMIN — LITHIUM CARBONATE 450 MG: 450 TABLET, EXTENDED RELEASE ORAL at 16:40

## 2019-07-12 RX ADMIN — TRAZODONE HYDROCHLORIDE 150 MG: 100 TABLET ORAL at 20:23

## 2019-07-12 RX ADMIN — ARIPIPRAZOLE 30 MG: 15 TABLET ORAL at 09:15

## 2019-07-12 RX ADMIN — LITHIUM CARBONATE 450 MG: 450 TABLET, EXTENDED RELEASE ORAL at 09:15

## 2019-07-12 RX ADMIN — Medication 1 LOZENGE: at 20:46

## 2019-07-12 RX ADMIN — Medication 1 SPRAY: at 14:32

## 2019-07-12 RX ADMIN — QUETIAPINE FUMARATE 50 MG: 25 TABLET ORAL at 20:23

## 2019-07-12 RX ADMIN — IBUPROFEN 400 MG: 400 TABLET ORAL at 16:40

## 2019-07-12 RX ADMIN — Medication 1 LOZENGE: at 16:40

## 2019-07-12 RX ADMIN — ACETAMINOPHEN 650 MG: 325 TABLET, FILM COATED ORAL at 09:15

## 2019-07-12 NOTE — CONSULTS
Hospitalist Consultation Note    NAME:  Teofilo Fernando   :   1995   MRN:   [id number]   Room Number: 297/06  @ 1400 Counts include 234 beds at the [facility]     ATTENDING: Meseret Fisher MD  PCP:  None    Date/Time:  2019 12:51 PM      Recommendations/Plan:     Viral upper respiratory tract infection  Sore throat  Epistaxis  Hypertension  Asthma, stable      Recommend warm saline gargles 4 times/day, PRN lozenges and Motrin  Blood pressure is within normal limits. Resume Norvasc  PRN albuterol  1. Psychiatric treatment and management of health issues,Defer to psychiatrist for further management. 2.  Continue home meds. 3.  Medically stable at this time. We will follow up on a p.r.n. basis. 4. No VTE prophylaxis indicated or warranted at this time. Subjective:   REQUESTING PHYSICIAN:  REASON FOR CONSULT:    Sore throat  [facility] is a 24 y.o.  male who I was asked to see for sore throat,epistaxis. Patient reports a sore throat since last night. He denies runny nose but does admit to postnasal drip. Denies cough, fever or chills. Reports symptoms started after he had a Thorazine shot yesterday. He has a history of asthma and uses inhaler as needed. This morning he had an episode of epistaxis which was well controlled with pressure. Past Medical History:   Diagnosis Date    Asthma     Hypertension     Psychiatric disorder     schitzoeffective disorder, depression, PTSD      No past surgical history-reviewed   Social History     Tobacco Use    Smoking status: Current Every Day Smoker    Smokeless tobacco: Never Used   Substance Use Topics    Alcohol use: No       family history -HTN   Allergies   Allergen Reactions    Amoxicillin Angioedema    Fish Containing Products Angioedema    Penicillin G Angioedema      Prior to Admission medications    Medication Sig Start Date End Date Taking?  Authorizing Provider   lithium carbonate CR (ESKALITH CR) 450 mg CR tablet Take 450 mg by mouth two (2) times a day. Yes Provider, Historical   ARIPiprazole (ABILIFY) 30 mg tablet Take 30 mg by mouth daily. Yes Provider, Historical   traZODone (DESYREL) 150 mg tablet Take 150 mg by mouth nightly as needed for Sleep. Yes Provider, Historical   hydrOXYzine pamoate (VISTARIL) 50 mg capsule Take 50 mg by mouth every four (4) hours as needed for Anxiety. Yes Provider, Historical   albuterol (PROVENTIL HFA, VENTOLIN HFA, PROAIR HFA) 90 mcg/actuation inhaler Take 2 Puffs by inhalation every six (6) hours as needed for Wheezing or Shortness of Breath. Yes Provider, Historical   amLODIPine (NORVASC) 2.5 mg tablet Take 2.5 mg by mouth daily. Indications: high blood pressure   Yes Provider, Historical   fluticasone propionate (FLONASE ALLERGY RELIEF) 50 mcg/actuation nasal spray 2 Sprays by Both Nostrils route nightly. Yes Provider, Historical   benzoyl peroxide 10 % clsr topical cleanser Apply  to affected area daily. Indications: acne   Yes Provider, Historical   coal tar (NEUTROGENA T-GEL) 0.5 % shampoo Apply  to affected area every Monday. Yes Provider, Historical       REVIEW OF SYSTEMS:     Total of 12 systems reviewed as follows:   I am not able to complete the review of systems because:    The patient is intubated and sedated    The patient has altered mental status due to his acute medical problems    The patient has baseline aphasia from prior stroke(s)    The patient has baseline dementia and is not reliable historian                 POSITIVE= underlined text  Negative = text not underlined  General:  fever, chills, sweats, generalized weakness, weight loss/gain,      loss of appetite   Eyes:    blurred vision, eye pain, loss of vision, double vision  ENT:    rhinorrhea, pharyngitis sore throat  Respiratory:   cough, sputum production, SOB, wheezing, MORRIS, pleuritic pain   Cardiology:   chest pain, palpitations, orthopnea, PND, edema, syncope   Gastrointestinal:  abdominal pain , N/V, dysphagia, diarrhea, constipation, bleeding   Genitourinary:  frequency, urgency, dysuria, hematuria, incontinence   Muskuloskeletal :  arthralgia, myalgia   Hematology:  easy bruising, nose or gum bleeding, lymphadenopathy   Dermatological: rash, ulceration, pruritis   Endocrine:   hot flashes or polydipsia   Neurological:  headache, dizziness, confusion, focal weakness, paresthesia,     Speech difficulties, memory loss, gait disturbance  Psychological: Feelings of anxiety, depression, agitation    Objective:   VITALS:    Visit Vitals  /86 (BP 1 Location: Left arm, BP Patient Position: Sitting)   Pulse 97   Temp 98.3 °F (36.8 °C)   Resp 18   Ht 6' (1.829 m)   Wt 106.6 kg (234 lb 15.8 oz)   SpO2 96%   BMI 31.87 kg/m²     Temp (24hrs), Av.2 °F (36.8 °C), Min:98.1 °F (36.7 °C), Max:98.3 °F (36.8 °C)      PHYSICAL EXAM:   General:    Alert, cooperative, no distress, appears stated age. HEENT: Atraumatic, anicteric sclerae, pink conjunctivae     No oral ulcers, mucosa moist, throat -mild congestion and erythema noticed back of throat   Neck:  Supple, symmetrical,  thyroid: non tender  Lungs:   Clear to auscultation bilaterally. No Wheezing or Rhonchi. No rales. Chest wall:  No tenderness  No Accessory muscle use. Heart:   Regular  rhythm,  No  murmur   No edema  Abdomen:   Soft, non-tender. Not distended. Bowel sounds normal  Extremities: No cyanosis. No clubbing  Skin:     Not pale. Not Jaundiced  No rashes   Psych:  Good insight. Not depressed. Not anxious or agitated. Neurologic: EOMs intact. No facial asymmetry. No aphasia or slurred speech.  Symmetrical strength, Alert and oriented X 4.     _______________________________________________________________________  Care Plan discussed with:    Comments   Patient     Family      RN     Care Manager                    Consultant:      ____________________________________________________________________  TOTAL TIME:     30 mins    Comments Reviewed previous records   >50% of visit spent in counseling and coordination of care  Discussion with patient and/or family and questions answered       Critical Care Provided     Minutes non procedure based  ________________________________________________________________________  Signed: Amelia Allison MD      Procedures: see electronic medical records for all procedures/Xrays and details which were not copied into this note but were reviewed prior to creation of Plan. LAB DATA REVIEWED:    No results found for this or any previous visit (from the past 24 hour(s)).     _____________________________  Hospitalist: Amelia Allison MD

## 2019-07-12 NOTE — BH NOTES
GROUP THERAPY PROGRESS NOTE    The patient Katya perez 24 y.o. male is participating in Coping Skills Group. Group time: 45 minutes    Personal goal for participation: To participate in self esteem booster    Goal orientation:  personal    Group therapy participation: active    Therapeutic interventions reviewed and discussed: positive words to live by    Impression of participation:  The patient was attentive.     Chelo Echavarria  7/12/2019  1:53 PM

## 2019-07-12 NOTE — BH NOTES
Psychiatric Progress Note    Patient: Jose Weldon MRN: [id number]  SSN: [id number]    YOB: 1995  Age: 24 y.o. Sex: male      Admit Date: 7/7/2019    LOS: 5 days     Subjective: Jose Weldon  reports feeling depressed and dissatisfied with his living arrangements. Was tearful during interview. Recalled this interviewer from past hospitalizations. Denies HI/AH/VH. Still has thoughts to cut himself with a sharp object. Contracts for safety in the hospital.  No aggression or violence. Appropriately interactive and aware. Tolerating medications well. Eating well and sleeping fairly. Connected to edulio. 7/9Hakeem Yadav reports feeling alright on unit but continues to have thoughts of killing himself. Denies HI/AH/VH. No aggression or violence. Appropriately interactive and aware. Tolerating medications well. Eating fairly but is having trouble sleeping. Requesting a different sleep aid. 7/10- Reports poor sleep still with anxiety and fear from the command auditory hallucinations. Also notes foot pain from an injury a few weeks ago. Isolates to room. Concerned about his housing plan and the thoughts of his out patient case management on this subject. Tolerating medications well. Notes eating and sleeping well. Sensitive. 7/11- Reports tearfully that he would like to go to a REACH home when discharged to manage the time until he can be moved to another home. Intermittent SI with no plan in hospital.  Slept 9hrs. Lithium level 0.62. Foot continues to hurt, but X-ray demonstrated no fractures. ..just a little positioning vs hammertoe concerns on 3 toes. Felt threatened on the unit but was able to utilize nursing for supports. Command hallucinations continue periodically. Dry flakes of skin on face and in hair. 7/12-  Reports feeling well and moods are good. Still having tearful episodes. Feels accosted on the unit by another patient.   Sore throat from last nights treatment. Denies SI/HI/AH/VH. No aggression or violence. Appropriately interactive and aware. Tolerating medications well. Eating and sleeping fairly.   Requesting to leave soon    Objective:     Vitals:    07/10/19 2012 07/11/19 0800 07/11/19 1940 07/12/19 0740   BP: 146/87 110/64 116/87 140/86   Pulse: 86 79 91 97   Resp: 18 18 18 18   Temp: 97.8 °F (36.6 °C) 97.6 °F (36.4 °C) 98.1 °F (36.7 °C) 98.3 °F (36.8 °C)   SpO2: 95% 97% 100% 96%   Weight:       Height:            Mental Status Exam:   Sensorium  oriented to time, place and person   Orientation situation   Relations cooperative   Eye Contact appropriate   Appearance:  age appropriate   Motor Behavior:  within normal limits   Speech:  normal volume and non-pressured   Vocabulary average   Thought Process: goal directed, concrete   Thought Content Simple   Suicidal ideations contracts for safety   Homicidal ideations none   Mood:  depressed   Affect:  constricted, depressed and tearful   Memory recent  adequate   Memory remote:  adequate   Concentration:  adequate   Abstraction:  concrete   Insight:  fair and limited   Reliability fair   Judgment:  age appropriate       MEDICATIONS:  Current Facility-Administered Medications   Medication Dose Route Frequency    ketoconazole (NIZORAL) 2 % shampoo   Topical DAILY PRN    QUEtiapine (SEROquel) tablet 50 mg  50 mg Oral QHS PRN    ARIPiprazole (ABILIFY) tablet 30 mg  30 mg Oral DAILY    chlorproMAZINE (THORAZINE) injection 50 mg  50 mg IntraMUSCular Q6H PRN    And    diphenhydrAMINE (BENADRYL) injection 25 mg  25 mg IntraMUSCular Q6H PRN    chlorproMAZINE (THORAZINE) tablet 50 mg  50 mg Oral Q6H PRN    benztropine (COGENTIN) tablet 2 mg  2 mg Oral BID PRN    benztropine (COGENTIN) injection 2 mg  2 mg IntraMUSCular BID PRN    LORazepam (ATIVAN) injection 2 mg  2 mg IntraMUSCular Q4H PRN    acetaminophen (TYLENOL) tablet 650 mg  650 mg Oral Q4H PRN    magnesium hydroxide (MILK OF MAGNESIA) 400 mg/5 mL oral suspension 30 mL  30 mL Oral DAILY PRN    nicotine (NICODERM CQ) 21 mg/24 hr patch 1 Patch  1 Patch TransDERmal DAILY PRN    lithium carbonate CR (ESKALITH CR) tablet 450 mg  450 mg Oral BID    traZODone (DESYREL) tablet 150 mg  150 mg Oral QHS    hydrOXYzine HCl (ATARAX) tablet 50 mg  50 mg Oral Q4H PRN    amLODIPine (NORVASC) tablet 2.5 mg  2.5 mg Oral DAILY      DISCUSSION:   the risks and benefits of the proposed medication  patient given opportunity to ask questions    Lab/Data Review: All lab results for the last 24 hours reviewed.      No major concerns    Assessment:     Principal Problem:    Schizoaffective disorder ([facility] (7/8/2019)    Active Problems:    Borderline personality disorder ([facility] (2/8/2018)      Mild intellectual disability (7/7/2019)        Plan:     Continue current care  Throat lozenge prn  Disposition planning with social work    Signed By: Jose D Tamez MD     July 12, 2019

## 2019-07-12 NOTE — BH NOTES
GROUP THERAPY PROGRESS NOTE    The patient Yessica perez 24 y.o. male is participating in Creative Expression Group. Group time: 1 hour    Personal goal for participation: To concentrate on selected task    Goal orientation: social    Group therapy participation: active    Therapeutic interventions reviewed and discussed: Crafts, games, music    Impression of participation: The patient was attentive.     Bethany Churchill  7/12/2019  4:46 PM
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
## 2019-07-12 NOTE — PROGRESS NOTES
Manav Hickey 91 RN received report from [facility] RN. 1873 Pt awake in bed. Pt presents with a cooperative mood, flat/sad affect, and anxious/depressed mood. Pt endorses AH. Pt stated voices are telling him that no one cares about him and that he is better off dead. Pt with intrusive thoughts. Pt endorses 10/10 anxiety and depression. Pt alert and oriented x 4. Pt denies SI, HI, and VH. Pt complained of 9/10 throat pain. 0915 Pt received prn 650 mg of tylenol for 9/10 throat pain. 1015 Pt reported 4/10 throat pain. Prn tylenol slightly effective. 1038 Pt presenting with a nose bleed. Pt requested to be seen by a medical doctor. This writer put in a consult for the pt to be seen by a medical doctor for throat pain and nose bleed. 1128 Pt actively attending coping skills group. Pt interacting well with staff and peers.  Pt became tearful when talking to the doctor. Pt returned to his room. Pt elaborated on his feelings to express concerns about the group home he attended and placement after discharge. 1311 Pt in room sleeping. 1430 Pt being seen by medical doctor Jeannine Johnson. Pt complained of throat pain. This writer gave the pt one spray of chloraseptic spray. 1500 Pt in [facility], requesting to speak to the . This writer will notify the  of the pt's request.     0890 Pt has orders to gargle with asif saline 4x's daily. Pt also has orders for throat lozenges, and motrin as needed for throat pain. 1639 This writer provided the pt with warm saline water to gargle with. This writer educated the pt on how to gargle using normal saline. 1640 Pt complained of 9/10 throat pain. Pt received 400 mg ibuprofen for 9/ throat pain. Pt also receive prn throat lozenge for throat pain. 1745 Pt reported feeling less throat pain 3/10. Prn throat lozenge and 400 mg ibuprofen effective.

## 2019-07-12 NOTE — BH NOTES
0600-Patient so far has slept 9 hours and is still asleep at this time. Will continue to monitor per protocol.

## 2019-07-13 PROCEDURE — 74011250637 HC RX REV CODE- 250/637: Performed by: PSYCHIATRY & NEUROLOGY

## 2019-07-13 PROCEDURE — 65220000003 HC RM SEMIPRIVATE PSYCH

## 2019-07-13 PROCEDURE — 74011250637 HC RX REV CODE- 250/637: Performed by: INTERNAL MEDICINE

## 2019-07-13 RX ORDER — ARIPIPRAZOLE 400 MG
400 KIT INTRAMUSCULAR
Status: DISCONTINUED | OUTPATIENT
Start: 2019-07-13 | End: 2019-07-15 | Stop reason: HOSPADM

## 2019-07-13 RX ADMIN — ARIPIPRAZOLE 400 MG: KIT at 12:13

## 2019-07-13 RX ADMIN — IBUPROFEN 400 MG: 400 TABLET ORAL at 08:39

## 2019-07-13 RX ADMIN — HYDROXYZINE HYDROCHLORIDE 50 MG: 25 TABLET, FILM COATED ORAL at 11:47

## 2019-07-13 RX ADMIN — CHLORPROMAZINE HYDROCHLORIDE 50 MG: 50 TABLET, SUGAR COATED ORAL at 06:03

## 2019-07-13 RX ADMIN — LITHIUM CARBONATE 450 MG: 450 TABLET, EXTENDED RELEASE ORAL at 16:41

## 2019-07-13 RX ADMIN — Medication 1 LOZENGE: at 12:09

## 2019-07-13 RX ADMIN — Medication 1 LOZENGE: at 19:20

## 2019-07-13 RX ADMIN — TRAZODONE HYDROCHLORIDE 150 MG: 100 TABLET ORAL at 20:21

## 2019-07-13 RX ADMIN — Medication 1 LOZENGE: at 06:03

## 2019-07-13 RX ADMIN — ACETAMINOPHEN 650 MG: 325 TABLET, FILM COATED ORAL at 12:09

## 2019-07-13 RX ADMIN — AMLODIPINE BESYLATE 2.5 MG: 5 TABLET ORAL at 08:35

## 2019-07-13 RX ADMIN — HYDROXYZINE HYDROCHLORIDE 50 MG: 25 TABLET, FILM COATED ORAL at 20:03

## 2019-07-13 RX ADMIN — ARIPIPRAZOLE 30 MG: 15 TABLET ORAL at 08:35

## 2019-07-13 RX ADMIN — LITHIUM CARBONATE 450 MG: 450 TABLET, EXTENDED RELEASE ORAL at 08:36

## 2019-07-13 RX ADMIN — Medication 1 LOZENGE: at 17:16

## 2019-07-13 RX ADMIN — IBUPROFEN 400 MG: 400 TABLET ORAL at 19:21

## 2019-07-13 RX ADMIN — QUETIAPINE FUMARATE 50 MG: 25 TABLET ORAL at 20:21

## 2019-07-13 NOTE — PROGRESS NOTES
1900 Assumed care of pt   2030 Pt self reports feeling anxious and depressed. Endorses AH to hurt self or others. Pt states he will not act on voices and will come to staff if he is feeling unsafe. 0000 Appears to be sleeping. No S/S acute distress noted  0550 Pt rested 8.5 hours overnight. Hourly rounds and Q15 minute checks maintained and to continue.  0605 Pt complaint of active AH which he was finding worrisome. PRN Thorazine given.

## 2019-07-13 NOTE — BH NOTES
Report received from Doug Vázquez RN at 0700. Patient currently in bed resting. Patient med compliant with morning meds. Patient requested Motrin at 9738 for tooth pain and sore throat, med given. Patient had nosebleed this morning, patient held pressure and bleeding stopped. Will request saline nasal spray. 1240  Patient in room resting. Patient came out for lunch and returned to room. Patient was given Abilify IM at 1213 and a throat logenze for sore throat. 1432 Patient pacing in howe.

## 2019-07-13 NOTE — BH NOTES
Psychiatric Progress Note    Patient: Porfirio Mcclellan MRN: [id number]  SSN: [id number]    YOB: 1995  Age: 24 y.o. Sex: male      Admit Date: 7/7/2019    LOS: 6 days     Subjective: Porfirio Mcclellan  reports feeling depressed and dissatisfied with his living arrangements. Was tearful during interview. Recalled this interviewer from past hospitalizations. Denies HI/AH/VH. Still has thoughts to cut himself with a sharp object. Contracts for safety in the hospital.  No aggression or violence. Appropriately interactive and aware. Tolerating medications well. Eating well and sleeping fairly. Connected to [employer]. 7/9Wilton Santana reports feeling alright on unit but continues to have thoughts of killing himself. Denies HI/AH/VH. No aggression or violence. Appropriately interactive and aware. Tolerating medications well. Eating fairly but is having trouble sleeping. Requesting a different sleep aid. 7/10- Reports poor sleep still with anxiety and fear from the command auditory hallucinations. Also notes foot pain from an injury a few weeks ago. Isolates to room. Concerned about his housing plan and the thoughts of his out patient case management on this subject. Tolerating medications well. Notes eating and sleeping well. Sensitive. 7/11- Reports tearfully that he would like to go to a REACH home when discharged to manage the time until he can be moved to another home. Intermittent SI with no plan in hospital.  Slept 9hrs. Lithium level 0.62. Foot continues to hurt, but X-ray demonstrated no fractures. ..just a little positioning vs hammertoe concerns on 3 toes. Felt threatened on the unit but was able to utilize nursing for supports. Command hallucinations continue periodically. Dry flakes of skin on face and in hair. 7/12-  Reports feeling well and moods are good. Still having tearful episodes. Feels accosted on the unit by another patient.   Sore throat from last nights treatment. Denies SI/HI/AH/VH. No aggression or violence. Appropriately interactive and aware. Tolerating medications well. Eating and sleeping fairly. Requesting to leave soon     7/13-   reports feeling well and moods are good. Denies SI/HI/AH/VH. No aggression or violence. Appropriately interactive and aware. Tolerating medications well. Eating and sleeping fairly. Had a bloody nose last evening and this morning. Denies picking his nose  Still has a problem with one individual on unit. Asked about LTI.     Objective:     Vitals:    07/11/19 1940 07/12/19 0740 07/12/19 1948 07/13/19 0800   BP: 116/87 140/86 124/75 136/89   Pulse: 91 97 89 88   Resp: 18 18 18 18   Temp: 98.1 °F (36.7 °C) 98.3 °F (36.8 °C) 97.7 °F (36.5 °C) 97.9 °F (36.6 °C)   SpO2: 100% 96% 100% 100%   Weight:       Height:            Mental Status Exam:   Sensorium  oriented to time, place and person   Orientation situation   Relations cooperative   Eye Contact appropriate   Appearance:  age appropriate   Motor Behavior:  within normal limits   Speech:  normal volume and non-pressured   Vocabulary average   Thought Process: goal directed, concrete   Thought Content Simple   Suicidal ideations contracts for safety   Homicidal ideations none   Mood:  depressed   Affect:  constricted, depressed and tearful   Memory recent  adequate   Memory remote:  adequate   Concentration:  adequate   Abstraction:  concrete   Insight:  fair and limited   Reliability fair   Judgment:  age appropriate       MEDICATIONS:  Current Facility-Administered Medications   Medication Dose Route Frequency    ibuprofen (MOTRIN) tablet 400 mg  400 mg Oral Q6H PRN    benzocaine-menthol (CHLORASEPTIC MAX) lozenge 1 Lozenge  1 Lozenge Oral Q2H PRN    ketoconazole (NIZORAL) 2 % shampoo   Topical DAILY PRN    QUEtiapine (SEROquel) tablet 50 mg  50 mg Oral QHS PRN    ARIPiprazole (ABILIFY) tablet 30 mg  30 mg Oral DAILY    chlorproMAZINE (THORAZINE) injection 50 mg  50 mg IntraMUSCular Q6H PRN    And    diphenhydrAMINE (BENADRYL) injection 25 mg  25 mg IntraMUSCular Q6H PRN    chlorproMAZINE (THORAZINE) tablet 50 mg  50 mg Oral Q6H PRN    benztropine (COGENTIN) tablet 2 mg  2 mg Oral BID PRN    benztropine (COGENTIN) injection 2 mg  2 mg IntraMUSCular BID PRN    LORazepam (ATIVAN) injection 2 mg  2 mg IntraMUSCular Q4H PRN    acetaminophen (TYLENOL) tablet 650 mg  650 mg Oral Q4H PRN    magnesium hydroxide (MILK OF MAGNESIA) 400 mg/5 mL oral suspension 30 mL  30 mL Oral DAILY PRN    nicotine (NICODERM CQ) 21 mg/24 hr patch 1 Patch  1 Patch TransDERmal DAILY PRN    lithium carbonate CR (ESKALITH CR) tablet 450 mg  450 mg Oral BID    traZODone (DESYREL) tablet 150 mg  150 mg Oral QHS    hydrOXYzine HCl (ATARAX) tablet 50 mg  50 mg Oral Q4H PRN    amLODIPine (NORVASC) tablet 2.5 mg  2.5 mg Oral DAILY      DISCUSSION:   the risks and benefits of the proposed medication  patient given opportunity to ask questions    Lab/Data Review: All lab results for the last 24 hours reviewed.      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

## 2019-07-14 PROCEDURE — 74011250637 HC RX REV CODE- 250/637: Performed by: PSYCHIATRY & NEUROLOGY

## 2019-07-14 PROCEDURE — 65220000003 HC RM SEMIPRIVATE PSYCH

## 2019-07-14 PROCEDURE — 74011250637 HC RX REV CODE- 250/637: Performed by: INTERNAL MEDICINE

## 2019-07-14 RX ADMIN — LITHIUM CARBONATE 450 MG: 450 TABLET, EXTENDED RELEASE ORAL at 08:16

## 2019-07-14 RX ADMIN — Medication 1 LOZENGE: at 18:17

## 2019-07-14 RX ADMIN — TRAZODONE HYDROCHLORIDE 150 MG: 100 TABLET ORAL at 21:16

## 2019-07-14 RX ADMIN — ARIPIPRAZOLE 30 MG: 15 TABLET ORAL at 08:16

## 2019-07-14 RX ADMIN — HYDROXYZINE HYDROCHLORIDE 50 MG: 25 TABLET, FILM COATED ORAL at 02:10

## 2019-07-14 RX ADMIN — Medication 1 LOZENGE: at 02:09

## 2019-07-14 RX ADMIN — ACETAMINOPHEN 650 MG: 325 TABLET, FILM COATED ORAL at 18:16

## 2019-07-14 RX ADMIN — ACETAMINOPHEN 650 MG: 325 TABLET, FILM COATED ORAL at 08:19

## 2019-07-14 RX ADMIN — AMLODIPINE BESYLATE 2.5 MG: 5 TABLET ORAL at 08:17

## 2019-07-14 RX ADMIN — IBUPROFEN 400 MG: 400 TABLET ORAL at 12:21

## 2019-07-14 RX ADMIN — IBUPROFEN 400 MG: 400 TABLET ORAL at 02:09

## 2019-07-14 RX ADMIN — LITHIUM CARBONATE 450 MG: 450 TABLET, EXTENDED RELEASE ORAL at 16:10

## 2019-07-14 NOTE — PROGRESS NOTES
Patient states he was frustrated earlier with another peer and he stated he walked away from the situation instead of engaging in an unhealthy one.

## 2019-07-14 NOTE — BH NOTES
0700-Patient report received from Linda Mensah RN.     0800-Patient eating in the dayroom. Patient denies SI/HI. Patient states he is hearing voices but can't make out what they are saying and denies HV. Patient states he has a headache and requested tylenol. 0850-Patient states his headache resolved, and offers no other complaints. 0900-Patient in his room resting. Patient stated he vomited blood last night. Patient reported that to Dr. Jesica Roy. Dr. Jesica Roy ordered nasal spray reference to his nose bleed yesterday and stating he believes the upset stomach was caused from a residual clot and dryness in the nares. 1043-Patient in his room resting. 1236-Patient eating lunch in the dayroom. 1436-Patient is walking the halls. 1806-Patient ate dinner in dayroom and is calm and cooperative. Continue to monitor per protocol.

## 2019-07-14 NOTE — PROGRESS NOTES
Diet as tolerated. Double portions ordered to help meet ~ needs. Pt noted to be meal compliant.   Hx notable for HTN, elev BMI  Ht: 6'  Wt: 234 lb, 15.8 oz  BMI: 31.87 kg/(m^2) c/w obesity grade I  Est energy needs; 2255 kcal, 75 g protein, 1 mL/kcal fluids  Pt will consume > 75% of meals at follow up 7-10 days  LOS

## 2019-07-14 NOTE — PROGRESS NOTES
1900 Assumed care of pt. Pt states he had a \"bad day\" due to another pt being loud and disruptive in the milieu earlier. Denies active SI, endorses depression and AH  1930 PRN Atarax given for anxiety, PRN Mortin given for generalized pain, and throat lozenge given for sore throat  2030 PRN Seroquel given per request for sleep  2230 Pt appears to be sleeping. No S/S of distress noted  0200 Pt requested PRN throat lozenge, Motrin, and Atarax. All given with good effect. 0400 Appears to be sleeping  0600 Pt slept 7.5 hours.  Hourly rounds and Q15 minute checks maintained and to continue

## 2019-07-15 VITALS
TEMPERATURE: 97.7 F | WEIGHT: 234.99 LBS | HEIGHT: 72 IN | RESPIRATION RATE: 16 BRPM | BODY MASS INDEX: 31.83 KG/M2 | HEART RATE: 92 BPM | DIASTOLIC BLOOD PRESSURE: 93 MMHG | SYSTOLIC BLOOD PRESSURE: 150 MMHG | OXYGEN SATURATION: 94 %

## 2019-07-15 PROCEDURE — 74011250637 HC RX REV CODE- 250/637: Performed by: PSYCHIATRY & NEUROLOGY

## 2019-07-15 PROCEDURE — 74011250637 HC RX REV CODE- 250/637: Performed by: INTERNAL MEDICINE

## 2019-07-15 RX ORDER — ARIPIPRAZOLE 30 MG/1
30 TABLET ORAL DAILY
Qty: 11 TAB | Refills: 0 | Status: SHIPPED | OUTPATIENT
Start: 2019-07-15

## 2019-07-15 RX ORDER — QUETIAPINE FUMARATE 50 MG/1
50 TABLET, FILM COATED ORAL
Qty: 30 TAB | Refills: 0 | Status: SHIPPED | OUTPATIENT
Start: 2019-07-15

## 2019-07-15 RX ORDER — ARIPIPRAZOLE 400 MG
400 KIT INTRAMUSCULAR
Qty: 400 MG | Refills: 0 | Status: SHIPPED | OUTPATIENT
Start: 2019-08-12

## 2019-07-15 RX ADMIN — IBUPROFEN 400 MG: 400 TABLET ORAL at 08:33

## 2019-07-15 RX ADMIN — LITHIUM CARBONATE 450 MG: 450 TABLET, EXTENDED RELEASE ORAL at 08:29

## 2019-07-15 RX ADMIN — ARIPIPRAZOLE 30 MG: 15 TABLET ORAL at 08:29

## 2019-07-15 RX ADMIN — KETOCONAZOLE: 20 SHAMPOO, SUSPENSION TOPICAL at 08:01

## 2019-07-15 RX ADMIN — Medication 1 LOZENGE: at 08:33

## 2019-07-15 RX ADMIN — AMLODIPINE BESYLATE 2.5 MG: 5 TABLET ORAL at 08:29
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
## 2019-07-15 NOTE — PROGRESS NOTES
Pharmacist Discharge Medication Reconciliation    Discharging Provider: Dr. Kristian Lindsay    Chief Complaint for this Admission: No chief complaint on file. Allergies: Amoxicillin; Fish containing products; and Penicillin g    Discharge Medications:   Current Discharge Medication List        START taking these medications    Details   ARIPiprazole (ABILIFY MAINTENA) 400 mg injection 400 mg by IntraMUSCular route every month. Indications: schizoaffective Disorder  Qty: 400 mg, Refills: 0    Comments: Next dose August 12th      sodium chloride (OCEAN) 0.65 % nasal squeeze bottle 0.1 mL by Both Nostrils route every two (2) hours as needed for Congestion. Indications: Dryness of the Nose  Qty: 44 mL, Refills: 0      QUEtiapine (SEROQUEL) 50 mg tablet Take 1 Tab by mouth nightly as needed (insomnia). Indications: Bipolar Depression  Qty: 30 Tab, Refills: 0      benzocaine-menthol (CHLORASEPTIC MAX) 15-10 mg lozg lozenge Take 1 Lozenge by mouth every two (2) hours as needed for Sore throat. Indications: mouth irritation  Qty: 30 Lozenge, Refills: 0           CONTINUE these medications which have CHANGED    Details   ARIPiprazole (ABILIFY) 30 mg tablet Take 1 Tab by mouth daily. Indications: Amber associated with Bipolar Disorder  Qty: 11 Tab, Refills: 0           CONTINUE these medications which have NOT CHANGED    Details   lithium carbonate CR (ESKALITH CR) 450 mg CR tablet Take 450 mg by mouth two (2) times a day. traZODone (DESYREL) 150 mg tablet Take 150 mg by mouth nightly as needed for Sleep.      hydrOXYzine pamoate (VISTARIL) 50 mg capsule Take 50 mg by mouth every four (4) hours as needed for Anxiety. albuterol (PROVENTIL HFA, VENTOLIN HFA, PROAIR HFA) 90 mcg/actuation inhaler Take 2 Puffs by inhalation every six (6) hours as needed for Wheezing or Shortness of Breath. amLODIPine (NORVASC) 2.5 mg tablet Take 2.5 mg by mouth daily.  Indications: high blood pressure      fluticasone propionate (FLONASE ALLERGY RELIEF) 50 mcg/actuation nasal spray 2 Sprays by Both Nostrils route nightly. benzoyl peroxide 10 % clsr topical cleanser Apply  to affected area daily. Indications: acne      coal tar (NEUTROGENA T-GEL) 0.5 % shampoo Apply  to affected area every Monday.              The patient's chart, MAR and AVS were reviewed by Hemanth Webb, JAYCEED, BCPS

## 2019-07-15 NOTE — BH NOTES
1900 Assumed care of patient. 2000 In day area, quietly watching TV  2115 Medication compliant  2145 To bed and appears to be sleeping  0000 Appears to be sleeping  0300 Continues to appear to be sleeping.  0600 Has slept ~ 7 hours this shift Hourly rounds and Q 15 minute checks continue.

## 2019-07-15 NOTE — PROGRESS NOTES
10 [facility] RN received report from [employer].    6434 Pt presents with a cooperative attitude, labile affect, and anxious mood. Pt endorses anxiety 8/10. Pt complained of 9/10 throat pain. Pt received prn motrin 400 and lozenge. Pt alert and oriented x 4. Pt med and meal compliant. Pt denies SI, HI, and AVH. Pt concerned about discharge. Pt to discharge today. 0940 Pt reported feeling less pain 4/10 throat pain. 1030 Pt actively attending coping skills group. Pt interacting well with staff and peers. 1423 Pt to be discharged today. Pt being transported home via his . Pt verbalized understanding and signed to acknowledge understanding of discharge instructions. Pt denied SI, HI, and AVH. Pt received prescriptions to go home with upon discharge.

## 2019-07-15 NOTE — DISCHARGE INSTRUCTIONS
DISCHARGE SUMMARY    NAME:Yeison Bailey  : 1995  MRN: [id number]    The patient William Mckeon exhibits the ability to control behavior in a less restrictive environment. Patient's level of functioning is improving. No assaultive/destructive behavior has been observed for the past 24 hours. No suicidal/homicidal threat or behavior has been observed for the past 24 hours. There is no evidence of serious medication side effects. Patient has not been in physical or protective restraints for at least the past 24 hours. If weapons involved, how are they secured? No weapons involved. Is patient aware of and in agreement with discharge plan? Yes    Arrangements for medication:  Prescriptions given to patient. Copy of discharge instructions to provider?:  [facility] ([phone]); Bernarda Castro ([phone])    Arrangements for transportation home:  Nataly from [facility] to  and transport. Keep all follow up appointments as scheduled, continue to take prescribed medications per physician instructions. Mental health crisis number:  635 or your local mental health crisis line number at [phone]. Patient Education        Learning About Schizoaffective Disorder  What is schizoaffective disorder? Schizoaffective (say \"bmgb-st-ie-FECK-tiv\") disorder is a complex mental illness. People who have it have the symptoms of both schizophrenia and a mood disorder. The disorder affects how clearly you can think. It can also make it hard to manage your emotions and connect with others. And it affects how happy or sad you feel. What causes it? Experts don't know what causes schizoaffective disorder. It may have different causes for different people. It's not caused by anything you did or how your parents raised you. And it's not a sign of weakness. What are the symptoms?   The symptoms of schizoaffective disorder are the same as those of schizophrenia and a mood disorder. People with schizoaffective disorder may have many of these symptoms. Schizophrenia symptoms include:  · Having hallucinations. This means that you see or hear things that aren't really there. · Having delusions. These are beliefs that aren't real.  · Having a hard time feeling and showing emotion. Mood disorder symptoms include:  · Depression. · Feeling extremely happy or having lots of energy. How is it diagnosed? Your doctor or mental health professional usually can tell if you have schizoaffective disorder by talking with you. He or she will look at the order and timing of your symptoms and how long your symptoms last.  Your doctor will ask you about other things too. These may include questions about:  · Any odd experiences you may have had, such as hearing voices or having confusing thoughts. · Your feelings. · Any changes in eating habits, energy level, and interest in daily tasks. · How well you are sleeping. · If you can focus on the things you do. How is it treated? Finding out that you have schizoaffective disorder can be scary and hard to deal with. But the disorder can be treated. The goal of treatment is to lower your stress and help your brain work as it should. Ongoing treatment can keep the disorder under control. Treatment includes medicines and counseling. Medicines help your symptoms. It's important to take your medicines on schedule to keep your moods even. When you feel good, you may think that you don't need them. But it is important to keep taking them. Counseling helps you change how you think about things. It can also help you cope with the illness. You will work with a mental health professional. This may be a psychologist, a licensed professional counselor, a clinical , or a psychiatrist.  Follow-up care is a key part of your treatment and safety. Be sure to make and go to all appointments, and call your doctor if you are having problems.  It's also a good idea to know your test results and keep a list of the medicines you take. Where can you learn more? Go to http://peg-shell.info/. Enter A016 in the search box to learn more about \"Learning About Schizoaffective Disorder. \"  Current as of: September 11, 2018  Content Version: 11.9  © 8993-7741 [employer], [employer]. Care instructions adapted under license by [employer] (which disclaims liability or warranty for this information). If you have questions about a medical condition or this instruction, always ask your healthcare professional. [place] [address] any warranty or liability for your use of this information. If I feel I am at risk of hurting myself or others, I will call the crisis office and speak with a crisis worker who will assist me during my crisis.   [address]  [phone]  [address] [place] [address] [phone] [address] [facility]  [address] Crisis-  [phone]

## 2019-07-15 NOTE — BH NOTES
Psychiatric Progress Note    Patient: Katya Manning MRN: [id number]  SSN: [id number]    YOB: 1995  Age: 24 y.o. Sex: male      Admit Date: 7/7/2019    LOS: 8 days     Subjective: Katya Manning  reports feeling depressed and dissatisfied with his living arrangements. Was tearful during interview. Recalled this interviewer from past hospitalizations. Denies HI/AH/VH. Still has thoughts to cut himself with a sharp object. Contracts for safety in the hospital.  No aggression or violence. Appropriately interactive and aware. Tolerating medications well. Eating well and sleeping fairly. Connected to [place]. 7/9Orest Dieter reports feeling alright on unit but continues to have thoughts of killing himself. Denies HI/AH/VH. No aggression or violence. Appropriately interactive and aware. Tolerating medications well. Eating fairly but is having trouble sleeping. Requesting a different sleep aid. 7/10- Reports poor sleep still with anxiety and fear from the command auditory hallucinations. Also notes foot pain from an injury a few weeks ago. Isolates to room. Concerned about his housing plan and the thoughts of his out patient case management on this subject. Tolerating medications well. Notes eating and sleeping well. Sensitive. 7/11- Reports tearfully that he would like to go to a REACH home when discharged to manage the time until he can be moved to another home. Intermittent SI with no plan in hospital.  Slept 9hrs. Lithium level 0.62. Foot continues to hurt, but X-ray demonstrated no fractures. ..just a little positioning vs hammertoe concerns on 3 toes. Felt threatened on the unit but was able to utilize nursing for supports. Command hallucinations continue periodically. Dry flakes of skin on face and in hair. 7/12-  Reports feeling well and moods are good. Still having tearful episodes. Feels accosted on the unit by another patient.   Sore throat from last nights treatment. Denies SI/HI/AH/VH. No aggression or violence. Appropriately interactive and aware. Tolerating medications well. Eating and sleeping fairly. Requesting to leave soon     7/13-   reports feeling well and moods are good. Denies SI/HI/AH/VH. No aggression or violence. Appropriately interactive and aware. Tolerating medications well. Eating and sleeping fairly. Had a bloody nose last evening and this morning. Denies picking his nose  Still has a problem with one individual on unit. Asked about LTI. 7/14-   reports coming close to loosing it on another patient but was able to collect himself and demonstrate restraint and judgement. Moods are good. Denies SI/HI/AH/VH. No aggression or violence. Appropriately interactive and aware. Tolerating medications well. Eating and sleeping fairly. Requesting to leave tomorrow.       Objective:     Vitals:    07/13/19 0800 07/13/19 1936 07/14/19 0813 07/14/19 1925   BP: 136/89 118/62 116/48 (!) 150/93   Pulse: 88 77 98 92   Resp: 18 18 18 16   Temp: 97.9 °F (36.6 °C) 97.6 °F (36.4 °C) 97.7 °F (36.5 °C) 97.7 °F (36.5 °C)   SpO2: 100% 100% 99% 94%   Weight:       Height:            Mental Status Exam:   Sensorium  oriented to time, place and person   Orientation situation   Relations cooperative   Eye Contact appropriate   Appearance:  age appropriate   Motor Behavior:  within normal limits   Speech:  normal volume and non-pressured   Vocabulary average   Thought Process: goal directed, concrete   Thought Content Simple   Suicidal ideations contracts for safety   Homicidal ideations none   Mood:  depressed   Affect:  constricted, depressed and tearful   Memory recent  adequate   Memory remote:  adequate   Concentration:  adequate   Abstraction:  concrete   Insight:  fair and limited   Reliability fair   Judgment:  age appropriate       MEDICATIONS:  Current Facility-Administered Medications   Medication Dose Route Frequency    sodium chloride (OCEAN) 0.65 % nasal squeeze bottle 2 Spray  2 Spray Both Nostrils Q2H PRN    ARIPiprazole (ABILIFY MAINTENA) injection 400 mg  400 mg IntraMUSCular EVERY MONTH    ibuprofen (MOTRIN) tablet 400 mg  400 mg Oral Q6H PRN    benzocaine-menthol (CHLORASEPTIC MAX) lozenge 1 Lozenge  1 Lozenge Oral Q2H PRN    ketoconazole (NIZORAL) 2 % shampoo   Topical DAILY PRN    QUEtiapine (SEROquel) tablet 50 mg  50 mg Oral QHS PRN    ARIPiprazole (ABILIFY) tablet 30 mg  30 mg Oral DAILY    chlorproMAZINE (THORAZINE) injection 50 mg  50 mg IntraMUSCular Q6H PRN    And    diphenhydrAMINE (BENADRYL) injection 25 mg  25 mg IntraMUSCular Q6H PRN    chlorproMAZINE (THORAZINE) tablet 50 mg  50 mg Oral Q6H PRN    benztropine (COGENTIN) tablet 2 mg  2 mg Oral BID PRN    benztropine (COGENTIN) injection 2 mg  2 mg IntraMUSCular BID PRN    LORazepam (ATIVAN) injection 2 mg  2 mg IntraMUSCular Q4H PRN    acetaminophen (TYLENOL) tablet 650 mg  650 mg Oral Q4H PRN    magnesium hydroxide (MILK OF MAGNESIA) 400 mg/5 mL oral suspension 30 mL  30 mL Oral DAILY PRN    nicotine (NICODERM CQ) 21 mg/24 hr patch 1 Patch  1 Patch TransDERmal DAILY PRN    lithium carbonate CR (ESKALITH CR) tablet 450 mg  450 mg Oral BID    traZODone (DESYREL) tablet 150 mg  150 mg Oral QHS    hydrOXYzine HCl (ATARAX) tablet 50 mg  50 mg Oral Q4H PRN    amLODIPine (NORVASC) tablet 2.5 mg  2.5 mg Oral DAILY      DISCUSSION:   the risks and benefits of the proposed medication  patient given opportunity to ask questions    Lab/Data Review: All lab results for the last 24 hours reviewed.      No major concerns    Assessment:     Principal Problem:    Schizoaffective disorder ([facility] [address] (7/8/2019)    Active Problems:    Borderline personality disorder ([facility] [address] (2/8/2018)      Mild intellectual disability (7/7/2019)        Plan:     Continue current care with Abilify Maintena 300 mg IM   Disposition planning with social work for the next few days    Signed By: Yomaira Bradshaw MD     July 15, 2019

## 2019-07-15 NOTE — BH NOTES
Behavioral Health Transition Record to Provider    Patient Name: Linus Iglesias  YOB: 1995  Medical Record Number: [id number]  Date of Admission: 7/7/2019  Date of Discharge: 7/15/2019    Attending Provider: Norman Villa MD  Discharging Provider: Norman Villa MD  To contact this individual call [phone] and ask the  to page. If unavailable, ask to be transferred to [address] [place] [address] Provider on call. [facility] Provider will be available on call 24/7 and during holidays. Primary Care Provider: None    Allergies   Allergen Reactions    Amoxicillin Angioedema    Fish Containing Products Angioedema    Penicillin G Angioedema       Reason for Admission: Pt admitted under a temporary [facility] order (TDO) for alleged depression with suicidal ideations and psychosis, proving to be an imminent danger to self.      Admission Diagnosis: Schizoaffective disorder ([facility] [address] [F25.9]    * No surgery found *    Results for orders placed or performed during the hospital encounter of 07/07/19   CBC W/O DIFF   Result Value Ref Range    WBC 8.9 4.1 - 11.1 K/uL    RBC 5.43 4.10 - 5.70 M/uL    HGB 15.1 12.1 - 17.0 g/dL    HCT 45.5 36.6 - 50.3 %    MCV 83.8 80.0 - 99.0 FL    MCH 27.8 26.0 - 34.0 PG    MCHC 33.2 30.0 - 36.5 g/dL    RDW 12.6 11.5 - 14.5 %    PLATELET 640 014 - 449 K/uL    MPV 10.8 8.9 - 12.9 FL    NRBC 0.0 0  WBC    ABSOLUTE NRBC 0.00 0.00 - 0.01 K/uL   TSH 3RD GENERATION   Result Value Ref Range    TSH 1.19 0.36 - 3.74 uIU/mL   LIPID PANEL   Result Value Ref Range    LIPID PROFILE          Cholesterol, total 150 <200 MG/DL    Triglyceride 148 <150 MG/DL    HDL Cholesterol 29 MG/DL    LDL, calculated 91.4 0 - 100 MG/DL    VLDL, calculated 29.6 MG/DL    CHOL/HDL Ratio 5.2 (H) 0.0 - 5.0     METABOLIC PANEL, COMPREHENSIVE   Result Value Ref Range    Sodium 141 136 - 145 mmol/L    Potassium 4.5 3.5 - 5.1 mmol/L    Chloride 106 97 - 108 mmol/L    CO2 27 21 - 32 mmol/L Anion gap 8 5 - 15 mmol/L    Glucose 88 65 - 100 mg/dL    BUN 11 6 - 20 MG/DL    Creatinine 0.77 0.70 - 1.30 MG/DL    BUN/Creatinine ratio 14 12 - 20      GFR est AA >60 >60 ml/min/1.73m2    GFR est non-AA >60 >60 ml/min/1.73m2    Calcium 9.4 8.5 - 10.1 MG/DL    Bilirubin, total 1.0 0.2 - 1.0 MG/DL    ALT (SGPT) 55 12 - 78 U/L    AST (SGOT) 20 15 - 37 U/L    Alk. phosphatase 78 45 - 117 U/L    Protein, total 7.0 6.4 - 8.2 g/dL    Albumin 3.9 3.5 - 5.0 g/dL    Globulin 3.1 2.0 - 4.0 g/dL    A-G Ratio 1.3 1.1 - 2.2     LITHIUM   Result Value Ref Range    Lithium level 0.62 0.60 - 1.20 MMOL/L    Reported dose date: NOT PROVIDED      Reported dose time: NOT PROVIDED      Reported dose: NOT PROVIDED UNITS       Immunizations administered during this encounter:   Immunization History   Administered Date(s) Administered    Influenza Vaccine 10/01/2017       Screening for Metabolic Disorders for Patients on Antipsychotic Medications  (Data obtained from the EMR)    Estimated Body Mass Index  Estimated body mass index is 31.87 kg/m² as calculated from the following:    Height as of this encounter: 6' (1.829 m). Weight as of this encounter: 106.6 kg (234 lb 15.8 oz). Vital Signs/Blood Pressure  Visit Vitals  BP (P) 135/84   Pulse (P) 77   Temp (P) 97.3 °F (36.3 °C)   Resp (P) 16   Ht 6' (1.829 m)   Wt 106.6 kg (234 lb 15.8 oz)   SpO2 (P) 100%   BMI 31.87 kg/m²       Blood Glucose/Hemoglobin A1c  Lab Results   Component Value Date/Time    Glucose 88 07/08/2019 05:34 AM       No results found for: HBA1C, HGBE8, NHO1KNXJ     Lipid Panel  Lab Results   Component Value Date/Time    Cholesterol, total 150 07/08/2019 05:34 AM    HDL Cholesterol 29 07/08/2019 05:34 AM    LDL, calculated 91.4 07/08/2019 05:34 AM    Triglyceride 148 07/08/2019 05:34 AM    CHOL/HDL Ratio 5.2 (H) 07/08/2019 05:34 AM        Discharge Diagnosis: Schizoaffective disorder (ICD-10-CM: F25.9); Mild intellectual disability (ICD-10-CM: F70);  Borderline personality disorder (ICD-10-CM: F60.3)    Discharge Plan: Patient discharged into the care of [facility] staff. DISCHARGE SUMMARY    NAME:Yeison Grimaldo  : 1995  MRN: [id number]    The patient Lelo Argueta exhibits the ability to control behavior in a less restrictive environment. Patient's level of functioning is improving. No assaultive/destructive behavior has been observed for the past 24 hours. No suicidal/homicidal threat or behavior has been observed for the past 24 hours. There is no evidence of serious medication side effects. Patient has not been in physical or protective restraints for at least the past 24 hours. If weapons involved, how are they secured? No weapons involved. Is patient aware of and in agreement with discharge plan? Yes    Arrangements for medication:  Prescriptions given to patient. [phone][facility] Bernarda Castro ([phone])    Arrangements for transportation home:  [facility]y from [facility] to  and transport. Keep all follow up appointments as scheduled, continue to take prescribed medications per physician instructions. Mental health crisis number:  [phone] or your local mental health crisis line number at [phone]. Discharge Medication List and Instructions:   Discharge Medication List as of 7/15/2019 12:12 PM      START taking these medications    Details   ARIPiprazole (ABILIFY MAINTENA) 400 mg injection 400 mg by IntraMUSCular route every month. Indications: schizoaffective Disorder, PrintNext dose Disp-400 mg, R-0      sodium chloride (OCEAN) 0.65 % nasal squeeze bottle 0.1 mL by Both Nostrils route every two (2) hours as needed for Congestion. Indications: Dryness of the Nose, Print, Disp-44 mL, R-0      QUEtiapine (SEROQUEL) 50 mg tablet Take 1 Tab by mouth nightly as needed (insomnia).  Indications: Bipolar Depression, Print, Disp-30 Tab, R-0         CONTINUE these medications which have CHANGED    Details   ARIPiprazole (ABILIFY) 30 mg tablet Take 1 Tab by mouth daily. Indications: Amber associated with Bipolar Disorder, Print, Disp-11 Tab, R-0      benzocaine-menthol (CHLORASEPTIC MAX) 15-10 mg lozg lozenge Take 1 Lozenge by mouth every two (2) hours as needed for Sore throat. Indications: mouth irritation, Print, Disp-30 Lozenge, R-0         CONTINUE these medications which have NOT CHANGED    Details   lithium carbonate CR (ESKALITH CR) 450 mg CR tablet Take 450 mg by mouth two (2) times a day., Historical Med      traZODone (DESYREL) 150 mg tablet Take 150 mg by mouth nightly as needed for Sleep., Historical Med      hydrOXYzine pamoate (VISTARIL) 50 mg capsule Take 50 mg by mouth every four (4) hours as needed for Anxiety. , Historical Med      albuterol (PROVENTIL HFA, VENTOLIN HFA, PROAIR HFA) 90 mcg/actuation inhaler Take 2 Puffs by inhalation every six (6) hours as needed for Wheezing or Shortness of Breath., Historical Med      amLODIPine (NORVASC) 2.5 mg tablet Take 2.5 mg by mouth daily. Indications: high blood pressure, Historical Med      fluticasone propionate (FLONASE ALLERGY RELIEF) 50 mcg/actuation nasal spray 2 Sprays by Both Nostrils route nightly., Historical Med      benzoyl peroxide 10 % clsr topical cleanser Apply  to affected area daily. Indications: acne, Historical Med      coal tar (NEUTROGENA T-GEL) 0.5 % shampoo Apply  to affected area every Monday., Historical Med             Unresulted Labs (24h ago, onward)    None        To obtain results of studies pending at discharge, please contact [phone]    Follow-up Information     Follow up With Specialties Details Why Contact Info    Shalini Hamilton  On 8/15/2019 You have an 11:00am appointment with the psychiatrist for medication management.  [address], [address] [place] [address]  [phone]    Jamie Ureña  On 7/18/2019 You have a 10:00am hospital discharge follow-up appointment with your . [address] [facility]  [phone]    Abilify Maintena 400 mg IM injection  On 8/12/2019 Abilify Maintena 400 mg IM injection was given on 7/13/19, next injection is due on 8/12/19. Continue taking aripiprazole tablets until 7/27/19. None    None (395) Patient stated that they have no PCP            Advanced Directive:   Does the patient have an appointed surrogate decision maker? No  Does the patient have a Medical Advance Directive? No  Does the patient have a Psychiatric Advance Directive? No  If the patient does not have a surrogate or Medical Advance Directive AND Psychiatric Advance Directive, the patient was offered information on these advance directives Yes and Patient declined to complete    Patient Instructions: Please continue all medications until otherwise directed by physician. Tobacco Cessation Discharge Plan:   Is the patient a smoker and needs referral for smoking cessation? No  Patient referred to the following for smoking cessation with an appointment? Not applicable     Patient was offered medication to assist with smoking cessation at discharge? Not applicable  Was education for smoking cessation added to the discharge instructions? Yes    Alcohol/Substance Abuse Discharge Plan:   Does the patient have a history of substance/alcohol abuse and requires a referral for treatment? No  Patient referred to the following for substance/alcohol abuse treatment with an appointment? Not applicable  Patient was offered medication to assist with alcohol cessation at discharge? Not applicable  Was education for substance/alcohol abuse added to discharge instructions? No    Patient discharged to Home; discussed with patient/caregiver and provided to the patient/caregiver either in hard copy or electronically.

## 2020-01-29 NOTE — BH NOTES
Psychiatric Progress Note    Patient: Elizabeth Michael MRN: [id number]  SSN: [id number]    YOB: 1995  Age: 24 y.o. Sex: male      Admit Date: 7/7/2019    LOS: 2 days     Subjective: Elizabeth Michael  reports feeling depressed and dissatisfied with his living arrangements. Was tearful during interview. Recalled this interviewer from past hospitalizations. Denies HI/AH/VH. Still has thoughts to cut himself with a sharp object. Contracts for safety in the hospital.  No aggression or violence. Appropriately interactive and aware. Tolerating medications well. Eating well and sleeping fairly. Connected to [facility]. 7/9Thermachelle Ken reports feeling alright on unit but continues to have thoughts of killing himself. Denies HI/AH/VH. No aggression or violence. Appropriately interactive and aware. Tolerating medications well. Eating fairly but is having trouble sleeping. Requesting a different sleep aid.     Objective:     Vitals:    07/08/19 0200 07/08/19 0750 07/08/19 2050 07/09/19 0816   BP:  124/62 125/74 127/66   Pulse:  70 (!) 56 (!) 56   Resp:  16 18 12   Temp:  97.8 °F (36.6 °C) 98 °F (36.7 °C) 97.7 °F (36.5 °C)   SpO2:  (!) 70% 99% 99%   Weight: 106.6 kg (234 lb 15.8 oz)      Height: 6' (1.829 m)           Mental Status Exam:   Sensorium  oriented to time, place and person   Orientation situation   Relations cooperative   Eye Contact appropriate   Appearance:  age appropriate   Motor Behavior:  within normal limits   Speech:  normal volume and non-pressured   Vocabulary average   Thought Process: goal directed, concrete   Thought Content Simple   Suicidal ideations contracts for safety   Homicidal ideations none   Mood:  depressed   Affect:  constricted, depressed and tearful   Memory recent  adequate   Memory remote:  adequate   Concentration:  adequate   Abstraction:  concrete   Insight:  fair and limited   Reliability fair   Judgment:  age appropriate MEDICATIONS:  Current Facility-Administered Medications   Medication Dose Route Frequency    ARIPiprazole (ABILIFY) tablet 30 mg  30 mg Oral DAILY    chlorproMAZINE (THORAZINE) injection 50 mg  50 mg IntraMUSCular Q6H PRN    And    diphenhydrAMINE (BENADRYL) injection 25 mg  25 mg IntraMUSCular Q6H PRN    chlorproMAZINE (THORAZINE) tablet 50 mg  50 mg Oral Q6H PRN    benztropine (COGENTIN) tablet 2 mg  2 mg Oral BID PRN    benztropine (COGENTIN) injection 2 mg  2 mg IntraMUSCular BID PRN    LORazepam (ATIVAN) injection 2 mg  2 mg IntraMUSCular Q4H PRN    acetaminophen (TYLENOL) tablet 650 mg  650 mg Oral Q4H PRN    magnesium hydroxide (MILK OF MAGNESIA) 400 mg/5 mL oral suspension 30 mL  30 mL Oral DAILY PRN    nicotine (NICODERM CQ) 21 mg/24 hr patch 1 Patch  1 Patch TransDERmal DAILY PRN    lithium carbonate CR (ESKALITH CR) tablet 450 mg  450 mg Oral BID    traZODone (DESYREL) tablet 150 mg  150 mg Oral QHS    hydrOXYzine HCl (ATARAX) tablet 50 mg  50 mg Oral Q4H PRN    amLODIPine (NORVASC) tablet 2.5 mg  2.5 mg Oral DAILY      DISCUSSION:   the risks and benefits of the proposed medication  patient given opportunity to ask questions    Lab/Data Review: All lab results for the last 24 hours reviewed.      No major concerns    Assessment:     Active Problems:    Borderline personality disorder ([facility] [address] (2/8/2018)      Mild intellectual disability (7/7/2019)      Schizoaffective disorder ([facility] (7/8/2019)        Plan:     Continue current care  Seroquel 50 mg PO Qhs prn insomnia related to moods  Disposition planning with social work    Signed By: Mitchell Lutz MD     July 9, 2019 No

## 2021-01-13 ENCOUNTER — HOSPITAL ENCOUNTER (EMERGENCY)
Age: 26
Discharge: PSYCHIATRIC OR SUBSTANCE USE FACILITY | End: 2021-01-14
Attending: EMERGENCY MEDICINE
Payer: MEDICAID

## 2021-01-13 DIAGNOSIS — R45.851 SUICIDAL IDEATION: Primary | ICD-10-CM

## 2021-01-13 LAB
ALBUMIN SERPL-MCNC: 4.3 G/DL (ref 3.5–5)
ALBUMIN/GLOB SERPL: 1.2 {RATIO} (ref 1.1–2.2)
ALP SERPL-CCNC: 94 U/L (ref 45–117)
ALT SERPL-CCNC: 84 U/L (ref 12–78)
AMORPH CRY URNS QL MICRO: ABNORMAL
AMPHET UR QL SCN: NEGATIVE
ANION GAP SERPL CALC-SCNC: 3 MMOL/L (ref 5–15)
APAP SERPL-MCNC: <2 UG/ML (ref 10–30)
APPEARANCE UR: ABNORMAL
AST SERPL-CCNC: 31 U/L (ref 15–37)
BACTERIA URNS QL MICRO: NEGATIVE /HPF
BARBITURATES UR QL SCN: NEGATIVE
BASOPHILS # BLD: 0 K/UL (ref 0–0.1)
BASOPHILS NFR BLD: 1 % (ref 0–1)
BENZODIAZ UR QL: NEGATIVE
BILIRUB SERPL-MCNC: 0.6 MG/DL (ref 0.2–1)
BILIRUB UR QL: NEGATIVE
BUN SERPL-MCNC: 10 MG/DL (ref 6–20)
BUN/CREAT SERPL: 14 (ref 12–20)
CALCIUM SERPL-MCNC: 9.8 MG/DL (ref 8.5–10.1)
CANNABINOIDS UR QL SCN: NEGATIVE
CHLORIDE SERPL-SCNC: 106 MMOL/L (ref 97–108)
CO2 SERPL-SCNC: 28 MMOL/L (ref 21–32)
COCAINE UR QL SCN: NEGATIVE
COLOR UR: ABNORMAL
COMMENT, HOLDF: NORMAL
CREAT SERPL-MCNC: 0.73 MG/DL (ref 0.7–1.3)
DIFFERENTIAL METHOD BLD: NORMAL
DRUG SCRN COMMENT,DRGCM: NORMAL
EOSINOPHIL # BLD: 0.1 K/UL (ref 0–0.4)
EOSINOPHIL NFR BLD: 1 % (ref 0–7)
EPITH CASTS URNS QL MICRO: ABNORMAL /LPF
ERYTHROCYTE [DISTWIDTH] IN BLOOD BY AUTOMATED COUNT: 12.6 % (ref 11.5–14.5)
ETHANOL SERPL-MCNC: <10 MG/DL
GLOBULIN SER CALC-MCNC: 3.5 G/DL (ref 2–4)
GLUCOSE SERPL-MCNC: 136 MG/DL (ref 65–100)
GLUCOSE UR STRIP.AUTO-MCNC: NEGATIVE MG/DL
HCT VFR BLD AUTO: 40.3 % (ref 36.6–50.3)
HGB BLD-MCNC: 14.3 G/DL (ref 12.1–17)
HGB UR QL STRIP: NEGATIVE
IMM GRANULOCYTES # BLD AUTO: 0 K/UL (ref 0–0.04)
IMM GRANULOCYTES NFR BLD AUTO: 0 % (ref 0–0.5)
KETONES UR QL STRIP.AUTO: NEGATIVE MG/DL
LEUKOCYTE ESTERASE UR QL STRIP.AUTO: ABNORMAL
LYMPHOCYTES # BLD: 3.1 K/UL (ref 0.8–3.5)
LYMPHOCYTES NFR BLD: 39 % (ref 12–49)
MCH RBC QN AUTO: 28.5 PG (ref 26–34)
MCHC RBC AUTO-ENTMCNC: 35.5 G/DL (ref 30–36.5)
MCV RBC AUTO: 80.4 FL (ref 80–99)
METHADONE UR QL: NEGATIVE
MONOCYTES # BLD: 0.7 K/UL (ref 0–1)
MONOCYTES NFR BLD: 9 % (ref 5–13)
NEUTS SEG # BLD: 4 K/UL (ref 1.8–8)
NEUTS SEG NFR BLD: 50 % (ref 32–75)
NITRITE UR QL STRIP.AUTO: NEGATIVE
NRBC # BLD: 0 K/UL (ref 0–0.01)
NRBC BLD-RTO: 0 PER 100 WBC
OPIATES UR QL: NEGATIVE
PCP UR QL: NEGATIVE
PH UR STRIP: 8.5 [PH] (ref 5–8)
PLATELET # BLD AUTO: 273 K/UL (ref 150–400)
PMV BLD AUTO: 9.8 FL (ref 8.9–12.9)
POTASSIUM SERPL-SCNC: 4 MMOL/L (ref 3.5–5.1)
PROT SERPL-MCNC: 7.8 G/DL (ref 6.4–8.2)
PROT UR STRIP-MCNC: NEGATIVE MG/DL
RBC # BLD AUTO: 5.01 M/UL (ref 4.1–5.7)
RBC #/AREA URNS HPF: ABNORMAL /HPF (ref 0–5)
SALICYLATES SERPL-MCNC: <1.7 MG/DL (ref 2.8–20)
SAMPLES BEING HELD,HOLD: NORMAL
SODIUM SERPL-SCNC: 137 MMOL/L (ref 136–145)
SP GR UR REFRACTOMETRY: 1.01 (ref 1–1.03)
UR CULT HOLD, URHOLD: NORMAL
UROBILINOGEN UR QL STRIP.AUTO: 1 EU/DL (ref 0.2–1)
WBC # BLD AUTO: 7.9 K/UL (ref 4.1–11.1)
WBC URNS QL MICRO: ABNORMAL /HPF (ref 0–4)

## 2021-01-13 PROCEDURE — 80053 COMPREHEN METABOLIC PANEL: CPT

## 2021-01-13 PROCEDURE — 82077 ASSAY SPEC XCP UR&BREATH IA: CPT

## 2021-01-13 PROCEDURE — 99285 EMERGENCY DEPT VISIT HI MDM: CPT

## 2021-01-13 PROCEDURE — 80179 DRUG ASSAY SALICYLATE: CPT

## 2021-01-13 PROCEDURE — 80307 DRUG TEST PRSMV CHEM ANLYZR: CPT

## 2021-01-13 PROCEDURE — 36415 COLL VENOUS BLD VENIPUNCTURE: CPT

## 2021-01-13 PROCEDURE — 85025 COMPLETE CBC W/AUTO DIFF WBC: CPT

## 2021-01-13 PROCEDURE — 81001 URINALYSIS AUTO W/SCOPE: CPT

## 2021-01-13 PROCEDURE — 80143 DRUG ASSAY ACETAMINOPHEN: CPT

## 2021-01-13 NOTE — ED TRIAGE NOTES
Triage:  Pt to the ED via escort of [employer] due to concerns over suicidal thoughts that occurred following a flash back of traumatic events as a child.

## 2021-01-14 ENCOUNTER — APPOINTMENT (OUTPATIENT)
Dept: GENERAL RADIOLOGY | Age: 26
End: 2021-01-14
Attending: EMERGENCY MEDICINE
Payer: MEDICAID

## 2021-01-14 VITALS
DIASTOLIC BLOOD PRESSURE: 85 MMHG | RESPIRATION RATE: 16 BRPM | SYSTOLIC BLOOD PRESSURE: 124 MMHG | OXYGEN SATURATION: 95 % | HEART RATE: 84 BPM | TEMPERATURE: 98.1 F

## 2021-01-14 LAB
COVID-19 RAPID TEST, COVR: NOT DETECTED
HEALTH STATUS, XMCV2T: NORMAL
SARS-COV-2, COV2: NOT DETECTED
SOURCE, COVRS: NORMAL
SPECIMEN SOURCE, FCOV2M: NORMAL
SPECIMEN SOURCE, FCOV2M: NORMAL
SPECIMEN TYPE, XMCV1T: NORMAL

## 2021-01-14 PROCEDURE — 87635 SARS-COV-2 COVID-19 AMP PRB: CPT

## 2021-01-14 PROCEDURE — 71046 X-RAY EXAM CHEST 2 VIEWS: CPT

## 2021-01-14 PROCEDURE — U0005 INFEC AGEN DETEC AMPLI PROBE: HCPCS

## 2021-01-14 PROCEDURE — 74011250637 HC RX REV CODE- 250/637: Performed by: EMERGENCY MEDICINE

## 2021-01-14 RX ORDER — CHLORPROMAZINE HYDROCHLORIDE 50 MG/1
50 TABLET, FILM COATED ORAL 3 TIMES DAILY
Qty: 42 TAB | Refills: 0 | Status: SHIPPED | OUTPATIENT
Start: 2021-01-14 | End: 2021-01-28

## 2021-01-14 RX ORDER — CHLORPROMAZINE HYDROCHLORIDE 50 MG/1
50 TABLET, FILM COATED ORAL 3 TIMES DAILY
Qty: 42 TAB | Refills: 0 | Status: SHIPPED | OUTPATIENT
Start: 2021-01-14 | End: 2021-01-14 | Stop reason: SDUPTHER

## 2021-01-14 RX ORDER — ACETAMINOPHEN 500 MG
500 TABLET ORAL
Status: COMPLETED | OUTPATIENT
Start: 2021-01-14 | End: 2021-01-14

## 2021-01-14 RX ADMIN — ACETAMINOPHEN 500 MG: 500 TABLET ORAL at 10:28
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
## 2021-01-14 NOTE — ED NOTES
0800 Bedside and Verbal shift change report given to David Chan, RN (oncoming nurse) by Antonio Cain, PARUL (offgoing nurse). Report included the following information SBAR, ED Summary, MAR and Recent Results. 9487 pt provided with breakfast tray, requesting more scrambled eggs. Pt updated on plan of care, pt aware of tentative transportation at 1130. Sitter at bedside. MD aware    0920 bsmart resent pt paperwork via fax. Counselor should be here at 11 to drop off medications for pt.    1030 pt anxious and pacing room. Requesting info regarding transportation and discharge from ED. Pt updated on plan and is agreeable. 1150 pt medications dropped off by counselor. Unable to fill thorazine d/t recent fill. bsmart aware. md notified    [phone] per [facility] staff, pt must have thorazine prescription to be accepted. MD notified    [phone] case management working on medication fill and payment for thorazine for pt. Pt updated. MD aware. 1352 unable to set up transportation until pt paperwork officially cleared per [facility] staff. MD aware. 1516 [facility] transportation set up ETA 2015. Case management notified. md notified. CM attempting to find earlier transportation. Attempted to call Eduard Monroe, supervisor at [facility], voicemail left. Awaiting return call. Melina Lubin 227, RN spoke with group home, per group home, [facility] staff can transport patient from hospital to [address] [place] [address]. 46 [facility] form, facesheet, ED summary printed and with pt medication at nurses station. Pt aware we are waiting for transportation. Per BSMART, [facility] unable to transport. 1835 pt ambulated around unit with this RN. Aware of 2015 arrival time for transport. Pt affect remains flat, pt cooperative.     2030 pt transported with [facility] via stretcher to [address] [place] [address] in no signs of acute distress

## 2021-01-14 NOTE — ED NOTES
800 PM  67-year-old male presents with suicidal ideation. Patient signed out to me by Dr. Saintclair Labella pending disposition and placement for psych admission.

## 2021-01-14 NOTE — BSMART NOTE
Per Earl Schwab when patient information is completed and his medications are at ED they will complete his admission and get acceptance for [facility]. As reported patient  will bring medications by 11am in the morning to ED.  also stated at this time he think he will able to transport patient to [address] [place] [address] but if that changes he will let someone know. Patient will wait in ED until placement is complete with [facility].

## 2021-01-14 NOTE — ED NOTES
Update from [facility] staff, pt is accepted at [facility] and needs fill out paper works and haveTB screening completed.  When completed BSMART will fax documents

## 2021-01-14 NOTE — BSMART NOTE
Spoke with Gt from [facility] at [phone].  He reported the documents need to be faxed for [facility] home acceptance.  This writer advised that per prior shift this had been done with exception of COVID results and chest x ray.  Gt will confirm receipt of other documents and this writer faxed the COVID and chest x ray.  He will call back around [phone]. 
 
 Called back and stated documents hadn't been received so they were re-faxed. Awaiting confirmation. Received documents and need to verify medications per Jana Ramirez. Spoke with Ms Seymour Ayon from [facility] and she indicated the medications have been dropped off by [facility] but he cannot provide transportation to [facility]. This writer spoke with patient's RN here in ER and it was reported that no medications were dropped off yet. Verified this also with patient, unit secretary, and registration. Called [facility] back and left message for them to return the call. Medications were delivered by Ms Seymour Ayon at [facility]. This counselor looked through them and one is missing, Thorazine 50 mg TID. Talked with Ms Seymour Ayon and she will check into it and call this writer back. Ms Seymour Ayon returned call and indicated the Thorazine is missing because per [facility] he has had it previously filled and it is not due until 2-12-21. Called Gt with [facility] back and had to leave message. Awaiting return call. Jana Ramirez with [facility] stated they could not take patient unless he had Thorazine. It cannot be filled at this time through his insurance. Dr Marci Reagan in ER willing to write and this writer requested care management get involved to assist with cost.  Currently waiting on pharmacy to fill. Medication filled and [facility] advised. Ajay Ramirez called back and asked if we could provide a months supply rather than than 2 weeks. Advised by Dr Marci Reagan and RN that we could not. Spoke back with Juanyomireece Ashley and let him know this. Patient is accepted and can be sent to [facility], [address] [place] [address] Drive and phone is [phone]. RN will arrange transportation.

## 2021-12-17 NOTE — BH NOTES
1. START Bactrim DS 1 tab 2 times per day for 7 days. Placed referral to general surgery, they will call to schedule an incision and drainage appointment. Apply warm compress. Avoid touch. If draining, keep covered with guaze and replace with saturated. Monitor for worsening pain or fevers, or other worsening signs or symptoms. Patient Education     Abscess (Antibiotic Treatment Only)  An abscess happens when bacteria get trapped under the skin and start to grow. Pus forms inside the abscess as the body responds to the bacteria. An abscess can happen with an insect bite, ingrown hair, blocked oil gland, pimple, cyst, or puncture wound. It is sometimes call a boil. In the early stages, your wound may be red and tender. For this stage, you may get antibiotics. If the abscess does not get better with antibiotics, it will need to be drained with a small cut. Home care  These tips will help you care for your abscess at home:  Â· Soak the wound in hot water or apply hot packs (small towel soaked in hot water) to the area for 20 minutes at a time. Do this 3 to 4 times a day, or as instructed. Use a new towel each time. Wash the towels afterward because they may be contaminated with bacteria after use. Â· Don't cut, squeeze, or pop the boil yourself. Â· Put antibiotic cream or ointment on the skin 3 to 4 times a day, unless something else was prescribed. Some ointments include an antibiotic plus a pain reliever. Â· If your healthcare provider prescribed antibiotics, don't stop taking them until you have finished the medicine or you are told to stop. Â· You may use an over-the-counter pain medicine to control pain, unless another pain medicine was prescribed. Talk with your provider before taking these medicines if you have chronic liver or kidney disease or ever had a stomach ulcer orÂ digestive bleeding. Follow-up care  Follow up with your healthcare provider, or as advised.  Check your wound each day for the signs Pt did not attend creative expression group. that the infection may be getting worse (see below). When to seek medical advice  Get prompt medical attention if any of these occur:  Â· An increase in redness or swelling  Â· Red streaks in the skin leading away from the abscess  Â· An increase in local pain or swelling  Â· Fever of 100.4ÂºF (38ÂºC) or higher, or as directed by your healthcare provider  Â· Pus or fluid coming from the abscess  Â· Boil returns after getting better  Mariela last reviewed this educational content on 8/1/2019  Â© 2868-5536 The [facility]. All rights reserved. This information is not intended as a substitute for professional medical care. Always follow your healthcare professional's instructions.

## 2022-03-18 PROBLEM — F29 PSYCHOSIS (HCC): Status: ACTIVE | Noted: 2018-02-08

## 2022-03-19 PROBLEM — F31.9 BIPOLAR DISORDER (HCC): Status: ACTIVE | Noted: 2018-02-09

## 2022-03-19 PROBLEM — F70 MILD INTELLECTUAL DISABILITY: Status: ACTIVE | Noted: 2019-07-07

## 2022-03-19 PROBLEM — F60.3 BORDERLINE PERSONALITY DISORDER (HCC): Status: ACTIVE | Noted: 2018-02-08

## 2022-03-19 PROBLEM — F32.3 MAJOR DEPRESSIVE DISORDER WITH PSYCHOTIC FEATURES (HCC): Status: ACTIVE | Noted: 2018-02-08

## 2022-03-19 PROBLEM — F25.9 SCHIZOAFFECTIVE DISORDER (HCC): Status: ACTIVE | Noted: 2019-07-08

## 2024-08-02 ENCOUNTER — HOSPITAL ENCOUNTER (EMERGENCY)
Facility: HOSPITAL | Age: 29
Discharge: HOME | End: 2024-08-08
Attending: EMERGENCY MEDICINE
Payer: COMMERCIAL

## 2024-08-02 DIAGNOSIS — R45.851 SUICIDAL IDEATION: Primary | ICD-10-CM

## 2024-08-02 DIAGNOSIS — R45.850 HOMICIDAL IDEATION: ICD-10-CM

## 2024-08-02 LAB
ALBUMIN SERPL-MCNC: 3.9 G/DL (ref 3.4–5)
ALBUMIN/GLOB SERPL: 1.3 (ref 0.8–1.7)
ALP SERPL-CCNC: 74 U/L (ref 45–117)
ALT SERPL-CCNC: 29 U/L (ref 16–61)
AMPHET UR QL SCN: NEGATIVE
ANION GAP SERPL CALC-SCNC: 8 MMOL/L (ref 3–18)
AST SERPL-CCNC: 13 U/L (ref 10–38)
BARBITURATES UR QL SCN: NEGATIVE
BENZODIAZ UR QL: NEGATIVE
BILIRUB SERPL-MCNC: 0.7 MG/DL (ref 0.2–1)
BUN SERPL-MCNC: 9 MG/DL (ref 7–18)
BUN/CREAT SERPL: 16 (ref 12–20)
CALCIUM SERPL-MCNC: 9.3 MG/DL (ref 8.5–10.1)
CANNABINOIDS UR QL SCN: NEGATIVE
CHLORIDE SERPL-SCNC: 106 MMOL/L (ref 100–111)
CO2 SERPL-SCNC: 25 MMOL/L (ref 21–32)
COCAINE UR QL SCN: NEGATIVE
CREAT SERPL-MCNC: 0.56 MG/DL (ref 0.6–1.3)
ETHANOL SERPL-MCNC: <3 MG/DL (ref 0–3)
GLOBULIN SER CALC-MCNC: 3 G/DL (ref 2–4)
GLUCOSE SERPL-MCNC: 145 MG/DL (ref 74–99)
Lab: NORMAL
METHADONE UR QL: NEGATIVE
OPIATES UR QL: NEGATIVE
PCP UR QL: NEGATIVE
POTASSIUM SERPL-SCNC: 3.8 MMOL/L (ref 3.5–5.5)
PROT SERPL-MCNC: 6.9 G/DL (ref 6.4–8.2)
SODIUM SERPL-SCNC: 139 MMOL/L (ref 136–145)

## 2024-08-02 PROCEDURE — 96372 THER/PROPH/DIAG INJ SC/IM: CPT

## 2024-08-02 PROCEDURE — 80053 COMPREHEN METABOLIC PANEL: CPT

## 2024-08-02 PROCEDURE — 6370000000 HC RX 637 (ALT 250 FOR IP): Performed by: PHYSICIAN ASSISTANT

## 2024-08-02 PROCEDURE — 80307 DRUG TEST PRSMV CHEM ANLYZR: CPT

## 2024-08-02 PROCEDURE — 90791 PSYCH DIAGNOSTIC EVALUATION: CPT | Performed by: SOCIAL WORKER

## 2024-08-02 PROCEDURE — 82077 ASSAY SPEC XCP UR&BREATH IA: CPT

## 2024-08-02 PROCEDURE — 99285 EMERGENCY DEPT VISIT HI MDM: CPT

## 2024-08-02 RX ORDER — LORAZEPAM 1 MG/1
1 TABLET ORAL
Status: COMPLETED | OUTPATIENT
Start: 2024-08-02 | End: 2024-08-02

## 2024-08-02 RX ADMIN — LORAZEPAM 1 MG: 1 TABLET ORAL at 17:56

## 2024-08-02 ASSESSMENT — PAIN - FUNCTIONAL ASSESSMENT: PAIN_FUNCTIONAL_ASSESSMENT: NONE - DENIES PAIN
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
## 2024-08-02 NOTE — ED TRIAGE NOTES
Pt states he's been actively suicidal the last couple days, and desires to hurt the people at his group home. Hx PTSD, Bipolar w/psychotic features, borderline multiple personality disorder. Pt desires to be admitted to behavioral health.
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
## 2024-08-02 NOTE — ED NOTES
Pt reports talking to telepsych and states \"the woman on the computer said I am going to be a TDO. Can you please give me something to calm me down. I'm very anxious right now.\" Pt appears anxious and pacing. MILVIA Aguilar notified. New orders to be placed.
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
## 2024-08-03 PROCEDURE — 6360000002 HC RX W HCPCS: Performed by: EMERGENCY MEDICINE

## 2024-08-03 PROCEDURE — 2580000003 HC RX 258: Performed by: EMERGENCY MEDICINE

## 2024-08-03 PROCEDURE — 90792 PSYCH DIAG EVAL W/MED SRVCS: CPT | Performed by: NURSE PRACTITIONER

## 2024-08-03 PROCEDURE — 6370000000 HC RX 637 (ALT 250 FOR IP): Performed by: EMERGENCY MEDICINE

## 2024-08-03 RX ORDER — HALOPERIDOL 5 MG/ML
5 INJECTION INTRAMUSCULAR ONCE
Status: COMPLETED | OUTPATIENT
Start: 2024-08-03 | End: 2024-08-03

## 2024-08-03 RX ORDER — LORAZEPAM 1 MG/1
2 TABLET ORAL ONCE
Status: COMPLETED | OUTPATIENT
Start: 2024-08-03 | End: 2024-08-03

## 2024-08-03 RX ORDER — LORAZEPAM 2 MG/ML
2 INJECTION INTRAMUSCULAR ONCE
Status: COMPLETED | OUTPATIENT
Start: 2024-08-03 | End: 2024-08-03

## 2024-08-03 RX ORDER — DIPHENHYDRAMINE HYDROCHLORIDE 50 MG/ML
50 INJECTION INTRAMUSCULAR; INTRAVENOUS ONCE
Status: COMPLETED | OUTPATIENT
Start: 2024-08-03 | End: 2024-08-03

## 2024-08-03 RX ADMIN — LORAZEPAM 2 MG: 2 INJECTION INTRAMUSCULAR; INTRAVENOUS at 16:16

## 2024-08-03 RX ADMIN — LORAZEPAM 2 MG: 1 TABLET ORAL at 09:05

## 2024-08-03 RX ADMIN — DIPHENHYDRAMINE HYDROCHLORIDE 50 MG: 50 INJECTION, SOLUTION INTRAMUSCULAR; INTRAVENOUS at 16:17

## 2024-08-03 RX ADMIN — ZIPRASIDONE MESYLATE 20 MG: 20 INJECTION, POWDER, LYOPHILIZED, FOR SOLUTION INTRAMUSCULAR at 11:43

## 2024-08-03 RX ADMIN — HALOPERIDOL LACTATE 5 MG: 5 INJECTION, SOLUTION INTRAMUSCULAR at 16:15

## 2024-08-03 NOTE — BSMART NOTE
Chief Complaint   Patient presents with    Suicidal    Homicidal       Patient was evaluated by Tele-Psych who recommends inpatient psychiatric treatment for suicidal ideations with a plan to lay out into traffic and homicidal ideations towards staff at group home, without a plan, and auditory hallucinations, that he is unable to determine what the voice(s) are saying.     Patient is voluntary for inpatient treatment.    Past Medical History:   Diagnosis Date    Acquired intellectual disability     Anxiety     Asthma     Bipolar disorder with psychotic features (HCC)     Hypertension     Multiple personality disorder (HCC)     Psychiatric disorder     schitzoeffective disorder, depression, PTSD    PTSD (post-traumatic stress disorder)        Prior to Visit Medications    Not on File         The following labs and vitals were reviewed with on-call psychiatrist.    CMP    Vitals:    08/02/24 1443   BP: 125/89   Pulse: 83   Resp: 16   Temp: 98.3 °F (36.8 °C)   SpO2: 99%         Writer met with patient to assess the following    Ambulation - Patient reports ability to ambulate without difficulty or the use of any assistive devices.    ADLs - Patient able to perform own ADLs without assistance.    DME - Patient requires none.    In consultation with [facility] provider Kayla Reardon . Patient meets criteria for acute inpatient treatment.  Due to unit acuity, Mild ID diagnosis, and CPAP machine; a Conduit bed search will be initiated; once contact has been made with legal guardian..       Marialuisa Emery, HARINDER, CSAC, CADC  BSMART Counselor

## 2024-08-03 NOTE — BSMART NOTE
Crisis note:    Consulted with Dr. Cason. Unable to accommodate patient at this time. Continue conduit bed search.

## 2024-08-03 NOTE — ED NOTES
Bedside and Verbal shift change report given to BERNICE Del Cid (oncoming nurse) by BERNICE Olivier (offgoing nurse). Report included the following information ED SBAR.      Assumed care of pt.    Pt resting in position of comfort on ED stretcher in lowest position with wheels locked, NAD noted, call light within reach, sitter at bedside
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
## 2024-08-03 NOTE — BSMART NOTE
CRISIS NOTE:  Spoke with REACH ClinicianAlpesh who advised Pt does have an open case with them and is currently receiving Crisis Stabilization.  Patient last had contact with them on 7/30/24, and often calls them to indicate when he is in crisis.  She advised Patient has confirmed diagnosis of Mild ID, Borderline Personality, PTSD, and Schizoaffective Disorder, bipolar type. Pt has been in and out of the psychiatric hospital which has limited their services. Patient reported to Crisis Clinician that [facility] is his guardian. REACH Clinician advised REACH is not aware of Pt having a guardian. Patient was seen by REACH Clinicians on the following dates:     7/13/24: Pt called indicating he was hearing voices  7/18/24 Pt called stated he was in crisis and then called back stating he was fine.    7/19/24: Crisis Call  7/20/24: Crisis Call  7/30/24: Scheduled Visit      Marialuisa Emery, LPC, CSAC, CADC  BSMART Counselor
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
## 2024-08-03 NOTE — ED NOTES
Report received from BERNICE Del Cid.    Pt is resting on stretcher, not in distress.  Respirations even and unlabored.  Vital signs taken and recorded. Pt is cooperative.    Pt still SI/HI with . Denies VH.    Sitter at doorway.
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
## 2024-08-03 NOTE — BSMART NOTE
Crisis Note: Called Conduit, spoke with Ayaan to initiate a bed search.        Marialuisa Emery, LPC, CSAC, CADC  BSMART Counselor
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
## 2024-08-03 NOTE — BSMART NOTE
Crisis Note: Called ED Registration and requested to update the chart:  [facility] contact number [phone]. Patient also has a , Carla Peck, [facility] who can be reached at the same number.         Marialuisa Emery, LPC, CSAC, CADC  BSMART Counselor

## 2024-08-03 NOTE — ED NOTES
Pt reports feeling agitated and states, \"I feel agitated and I might walk out of here.\"     MD notified.

## 2024-08-04 LAB
BASOPHILS # BLD: 0 K/UL (ref 0–0.1)
BASOPHILS NFR BLD: 0 % (ref 0–2)
DIFFERENTIAL METHOD BLD: ABNORMAL
EOSINOPHIL # BLD: 0.1 K/UL (ref 0–0.4)
EOSINOPHIL NFR BLD: 2 % (ref 0–5)
ERYTHROCYTE [DISTWIDTH] IN BLOOD BY AUTOMATED COUNT: 14.1 % (ref 11.6–14.5)
FLUAV RNA SPEC QL NAA+PROBE: NOT DETECTED
FLUBV RNA SPEC QL NAA+PROBE: NOT DETECTED
HCT VFR BLD AUTO: 43.8 % (ref 36–48)
HGB BLD-MCNC: 14.6 G/DL (ref 13–16)
IMM GRANULOCYTES # BLD AUTO: 0 K/UL (ref 0–0.04)
IMM GRANULOCYTES NFR BLD AUTO: 1 % (ref 0–0.5)
LYMPHOCYTES # BLD: 2.3 K/UL (ref 0.9–3.6)
LYMPHOCYTES NFR BLD: 29 % (ref 21–52)
MCH RBC QN AUTO: 28.2 PG (ref 24–34)
MCHC RBC AUTO-ENTMCNC: 33.3 G/DL (ref 31–37)
MCV RBC AUTO: 84.7 FL (ref 78–100)
MONOCYTES # BLD: 0.9 K/UL (ref 0.05–1.2)
MONOCYTES NFR BLD: 11 % (ref 3–10)
NEUTS SEG # BLD: 4.6 K/UL (ref 1.8–8)
NEUTS SEG NFR BLD: 58 % (ref 40–73)
NRBC # BLD: 0 K/UL (ref 0–0.01)
NRBC BLD-RTO: 0 PER 100 WBC
PLATELET # BLD AUTO: 200 K/UL (ref 135–420)
PMV BLD AUTO: 10.2 FL (ref 9.2–11.8)
RBC # BLD AUTO: 5.17 M/UL (ref 4.35–5.65)
SARS-COV-2 RNA RESP QL NAA+PROBE: NOT DETECTED
WBC # BLD AUTO: 8 K/UL (ref 4.6–13.2)

## 2024-08-04 PROCEDURE — 87636 SARSCOV2 & INF A&B AMP PRB: CPT

## 2024-08-04 PROCEDURE — 94761 N-INVAS EAR/PLS OXIMETRY MLT: CPT

## 2024-08-04 PROCEDURE — 6370000000 HC RX 637 (ALT 250 FOR IP): Performed by: EMERGENCY MEDICINE

## 2024-08-04 PROCEDURE — 90832 PSYTX W PT 30 MINUTES: CPT | Performed by: SOCIAL WORKER

## 2024-08-04 PROCEDURE — 85025 COMPLETE CBC W/AUTO DIFF WBC: CPT

## 2024-08-04 RX ORDER — ACETAMINOPHEN 500 MG
1000 TABLET ORAL
Status: COMPLETED | OUTPATIENT
Start: 2024-08-04 | End: 2024-08-04

## 2024-08-04 RX ORDER — LORAZEPAM 1 MG/1
2 TABLET ORAL
Status: DISCONTINUED | OUTPATIENT
Start: 2024-08-04 | End: 2024-08-08 | Stop reason: HOSPADM

## 2024-08-04 RX ADMIN — LORAZEPAM 2 MG: 1 TABLET ORAL at 16:50

## 2024-08-04 RX ADMIN — ACETAMINOPHEN 1000 MG: 500 TABLET ORAL at 13:12

## 2024-08-04 RX ADMIN — LORAZEPAM 2 MG: 1 TABLET ORAL at 13:12

## 2024-08-04 ASSESSMENT — PAIN SCALES - GENERAL: PAINLEVEL_OUTOF10: 5

## 2024-08-04 ASSESSMENT — PAIN DESCRIPTION - LOCATION: LOCATION: HEAD

## 2024-08-04 NOTE — ED NOTES
Pt pacing in room becoming agitated. Pt walked out of room stating \"I'm leaving, I not waiting anymore\". Pt began walking fast towards exit. Redirected pt back to room. Pt stated \"I need to talk to someone NOW. Or put me in a crisis stabilization unit before I hurt people\". Called Gigi in crisis and notified him.

## 2024-08-04 NOTE — ED NOTES
Pt stating that the command hallucinations are telling him to \"hurt the staff here at [facility].\"

## 2024-08-04 NOTE — ED NOTES
Upon rounding on pt, pt is resting in position of comfort with eyes closed, breathing even and unlabored on ED stretcher in lowest position with wheels locked, NAD noted or expressed, no further wants or needs at this time, call light within reach

## 2024-08-04 NOTE — BSMART NOTE
Crisis note:    Met with patient in room 18. Was lying calmly on the bed. Asked patient if he is wanting to be discharged and he replied no but asked if he could go to the [facility]. Informed patient that a bed search is on going but that this writer will call [facility] to ask about the [facility] and [facility]. States he would prefer not to go to the [place] [address] facility.    Discussed with patient since he is reporting both suicidal and homicidal ideations and auditory hallucinations that if he again attempts or threatens to leave the ER that a TDO will be sought. Patient verbalized understanding and seemed to know a great deal about the ECO/TDO process. Patient stated he will not try to elope from the ER.       Called [facility] answering service at [phone] and requested the on call representative contact crisis regarding patient being placed in REACH services at either the [facility] or a [facility].    1500: spoke to Alvina Ross (Abby), patient guardian at [facility] and received verbal consent to present patient to [facility] for services for either the [facility] or [facility].    1510: spoke to Dalila of the [facility] and will fax chart for review. [phone].    Informed patient SASKIA is reviewing his chart that was faxed. Verified with patient regarding using a C-PAP. He stated in the ER he is doing ok without one as the head of the bed is raised but reports he does use a C-PAP otherwise.

## 2024-08-04 NOTE — BSMART NOTE
Crisis Note: Spoke with Candida Barreto, [phone], re: bed search for patient.    Under review:  [facility]     Capacity:  [facility]- until Monday  [facility]        Decline:  [facility]ap  [facility]  [facility]  [facility]- no appropriate bed until Monday

## 2024-08-04 NOTE — BSMART NOTE
Crisis note:    Conduit bed search results thus far:    Update: Patient has ID and uses CPAP     Capacity:  [facility]- until Monday  [facility]- awaiting return call  [facility]     Decline:  [facility] hospitals- cpap  [facility]  [facility]  [facility]- no appropriate bed until Monday  [facility]  [facility]- ID/cpap  [facility] - ID  [facility]- ID and cpap

## 2024-08-04 NOTE — ED NOTES
I received the patient in turnover from Dr. Andujar at the end of his shift.  The patient is a 28-year-old male with past medical history significant for schizoaffective disorder, bipolar disorder and multiple personality disorder, who presented to the ED with suicidal and homicidal ideation.  He is voluntary and we are waiting placement through crisis.     Katy Morrissey MD  08/04/24 0670

## 2024-08-04 NOTE — ED NOTES
Bedside and Verbal shift change report given to BERNICE Del Cid (oncoming nurse) by BERNICE Muhammad (offgoing nurse). Report included the following information ED SBAR.    Assumed care of pt.    Pt resting in position of comfort on ED stretcher in lowest position with wheels locked, NAD noted, call light within reach, sitter at bedside

## 2024-08-05 PROCEDURE — 6370000000 HC RX 637 (ALT 250 FOR IP): Performed by: EMERGENCY MEDICINE

## 2024-08-05 PROCEDURE — 94761 N-INVAS EAR/PLS OXIMETRY MLT: CPT

## 2024-08-05 RX ADMIN — LORAZEPAM 2 MG: 1 TABLET ORAL at 09:14

## 2024-08-05 NOTE — ED NOTES
Pt requested prn ativan stating \"I'm feeling a little agitated, can I have my medicine please\".
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
## 2024-08-05 NOTE — BSMART NOTE
Crisis Note: Spoke with SASKIA Joiner, [phone], to [facility]; awaiting return call.    08/04/24 @ 10:57 pm  SASKIA Betts returned call and informed writer that she will send a message to the [facility] Liaison who will most likely return call to the Crisis office in the am; will assist as needed.

## 2024-08-05 NOTE — BSMART NOTE
Crisis Note: Conduit bed search updated. Mary Lou, [phone], informed writer that all options for bed search have been exhausted for now and search will continue later today, 8/05/24.    [facility]  [facility]  [facility]  [facility]     Decline:  [facility]- [facility]ap  [facility]  [facility]  [facility]- no appropriate bed until Monday  [facility]- ID/cpap  [facility] - ID  [facility]- ID and cpap  [facility] CSU

## 2024-08-05 NOTE — BSMART NOTE
Crisis Note: Spoke with BERNICE Salas, regarding medical forms that are needing to be completed and given to the ED provider to sign off. ED provider made aware of the plan.

## 2024-08-05 NOTE — BSMART NOTE
Crisis Note: Obtained completed paperwork from BERNICE Salas regarding placement with [facility]; safe mailed all completed paperwork to the [facility] liaison. Crisis will assist as needed.

## 2024-08-05 NOTE — ED NOTES
Shift report received from rita cotter    Pt resting in bed with eyes closed. Respirations are even, equal and unlabored. No signs of cardiopulmonary distress noted. Sitter at bedside

## 2024-08-06 LAB — VALPROATE SERPL-MCNC: <3 UG/ML (ref 50–100)

## 2024-08-06 PROCEDURE — 80164 ASSAY DIPROPYLACETIC ACD TOT: CPT

## 2024-08-06 PROCEDURE — 90792 PSYCH DIAG EVAL W/MED SRVCS: CPT | Performed by: NURSE PRACTITIONER

## 2024-08-06 PROCEDURE — 94761 N-INVAS EAR/PLS OXIMETRY MLT: CPT

## 2024-08-06 PROCEDURE — 6370000000 HC RX 637 (ALT 250 FOR IP): Performed by: EMERGENCY MEDICINE

## 2024-08-06 RX ORDER — DULOXETIN HYDROCHLORIDE 60 MG/1
60 CAPSULE, DELAYED RELEASE ORAL DAILY
Status: DISCONTINUED | OUTPATIENT
Start: 2024-08-06 | End: 2024-08-08 | Stop reason: HOSPADM

## 2024-08-06 RX ORDER — DIVALPROEX SODIUM 250 MG/1
500 TABLET, EXTENDED RELEASE ORAL 2 TIMES DAILY
Status: DISCONTINUED | OUTPATIENT
Start: 2024-08-06 | End: 2024-08-08 | Stop reason: HOSPADM

## 2024-08-06 RX ORDER — ACETAMINOPHEN 500 MG
1000 TABLET ORAL
Status: COMPLETED | OUTPATIENT
Start: 2024-08-06 | End: 2024-08-06

## 2024-08-06 RX ADMIN — ACETAMINOPHEN 1000 MG: 500 TABLET ORAL at 14:12

## 2024-08-06 RX ADMIN — DIVALPROEX SODIUM 500 MG: 250 TABLET, FILM COATED, EXTENDED RELEASE ORAL at 22:14

## 2024-08-06 RX ADMIN — DIVALPROEX SODIUM 500 MG: 250 TABLET, FILM COATED, EXTENDED RELEASE ORAL at 12:34

## 2024-08-06 RX ADMIN — DULOXETINE HYDROCHLORIDE 60 MG: 60 CAPSULE, DELAYED RELEASE ORAL at 12:34

## 2024-08-06 NOTE — BSMART NOTE
Crisis Note: At 9:15am, spoke with SASKIA Pinosion and Jacobo, REACH supervisor, [phone], regarding placement which she informed that the paperwork was incomplete which had put a delay on possibly placement; however, the ED provider would need to complete it. Spoke with BERNICE Mejia regarding the paperwork and it will be completed; however, most of the questions that are being asked should come from his PCP; however, she stated that she will completed it with the information she has in epic.     While speaking with Yuliet, attempted to have plan, such as getting in contact with the legal guardian's supervisor and calling the group home to see if the patient is welcomed back.     At 3:58pm, BERNICE Mejia called and informed that the patient would like to either go back to [address]  or he will walk out, so he can get TDO.   At 4:01pm, spoke with Christos Pulido, Group Home Owner, [phone], informed that the patient was able to return back home.   At 4:18pm, Crisis called [facility] 3 times; left a VM; no call back

## 2024-08-06 NOTE — ED NOTES
6:54 AM :Pt care assumed from Dr. Howard , ED provider. Pt complaint(s), current treatment plan, progression and available diagnostic results have been discussed thoroughly. The patient was seen and evaluated on my shift.   Rounding occurred: Yes  Intended Disposition: Admission  Pending diagnostic reports and/or labs (please list): Awaiting placement by behavioral health team.    9:45 AM repeat telepsych consult ordered.    11:30 AM Case discussed with telepsych nurse practitioner request that the patient receive a valproic acid level and initiate his home medications which includes Depakote 500 mg twice daily and Cymbalta 60 mg daily.    Patient requested to be sent back to [facility] in [place].  He also states that if he cannot be placed at the facility he thinks he will need to be temporarily detained because he will attempt to leave the facility.      6:07 PM : Pt care transferred to Dr. Howard  ,ED provider. History of patient complaint(s), available diagnostic reports and current treatment plan has been discussed thoroughly.   Bedside rounding on patient occured : Yes .  Intended disposition of patient : Placement  Pending diagnostics reports and/or labs (please list): None           Nathan Kang DO  08/06/24 180

## 2024-08-06 NOTE — ED NOTES
Bedside shift change report given to Patel (oncoming nurse) by EDDIE (offgoing nurse). Report included the following information Nurse Handoff Report.

## 2024-08-06 NOTE — BSMART NOTE
Crisis Note: Candida Barreto, [phone], informed writer that bed search options have been exhausted and bed search will resume in the am; will assist as needed.

## 2024-08-07 PROCEDURE — 94761 N-INVAS EAR/PLS OXIMETRY MLT: CPT

## 2024-08-07 PROCEDURE — 99282 EMERGENCY DEPT VISIT SF MDM: CPT | Performed by: NURSE PRACTITIONER

## 2024-08-07 PROCEDURE — 6370000000 HC RX 637 (ALT 250 FOR IP): Performed by: EMERGENCY MEDICINE

## 2024-08-07 RX ADMIN — DULOXETINE HYDROCHLORIDE 60 MG: 60 CAPSULE, DELAYED RELEASE ORAL at 08:31

## 2024-08-07 RX ADMIN — DIVALPROEX SODIUM 500 MG: 250 TABLET, FILM COATED, EXTENDED RELEASE ORAL at 08:31

## 2024-08-07 RX ADMIN — DIVALPROEX SODIUM 500 MG: 250 TABLET, FILM COATED, EXTENDED RELEASE ORAL at 21:08

## 2024-08-07 RX ADMIN — LORAZEPAM 2 MG: 1 TABLET ORAL at 12:22

## 2024-08-07 ASSESSMENT — PAIN - FUNCTIONAL ASSESSMENT: PAIN_FUNCTIONAL_ASSESSMENT: NONE - DENIES PAIN

## 2024-08-07 ASSESSMENT — PAIN SCALES - GENERAL: PAINLEVEL_OUTOF10: 0

## 2024-08-07 NOTE — ED NOTES
Bedside shift change report given to BERNICE Hernandez (oncoming nurse) by BERNICE Del Cid (offgoing nurse). Report included the following information Nurse Handoff Report.
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
## 2024-08-07 NOTE — BSMART NOTE
Crisis Note: Called [facility] at [phone]. Was told that I needed to speak to Tierra. Left a message on her voicemail to call me back regarding this patient.

## 2024-08-07 NOTE — ED NOTES
Assumed care of pt.    Pt resting in position of comfort with eyes closed, breathing even and unlabored on ED stretcher in lowest position with wheels locked, NAD noted, sitter at bedside

## 2024-08-07 NOTE — PROGRESS NOTES
Crisis Note: Patient is alert and oriented x 4, calm, cooperative. Patient denied thoughts of harm towards self or others, denied hallucinations, denied feelings of paranoia. Patient does not exhibit psychotic behavior. Patient verbalized that he \"would like to return to the group home, if the group home will take me back.\" Call placed to Mamadoujudith Cody, [phone], JFS/guardian regarding patient wants to return to the group home; awaiting return call.    08/06/24 @ 10:42 pm  IRIS Briones, [phone], returned call and informed this writer that patient has been given a 30-day notice at the group home, and patient cannot return to the group home. Anali also stated that a representative, for patient, will contact crisis in the morning.   Order signed. Imaging order has already been placed by myself.    DO Vick [facility]

## 2024-08-07 NOTE — ED NOTES
Pt states he is not longer suicidal or homicidal and states he wants to return back to his group home.
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
## 2024-08-07 NOTE — ED NOTES
Bedside shift change report given to BERNICE Francisco (oncoming nurse) by BERNICE Hernandez (offgoing nurse). Report included the following information Nurse Handoff Report.

## 2024-08-07 NOTE — BSMART NOTE
Crisis Note: received a call from Tierra from [facility] regarding disposition of this patient. She said she was going to talk to her supervisor and get back to us.

## 2024-08-08 VITALS
SYSTOLIC BLOOD PRESSURE: 128 MMHG | RESPIRATION RATE: 16 BRPM | WEIGHT: 224 LBS | HEIGHT: 71 IN | BODY MASS INDEX: 31.36 KG/M2 | OXYGEN SATURATION: 96 % | TEMPERATURE: 97.7 F | DIASTOLIC BLOOD PRESSURE: 73 MMHG | HEART RATE: 75 BPM

## 2024-08-08 PROCEDURE — 6370000000 HC RX 637 (ALT 250 FOR IP): Performed by: EMERGENCY MEDICINE

## 2024-08-08 RX ADMIN — DULOXETINE HYDROCHLORIDE 60 MG: 60 CAPSULE, DELAYED RELEASE ORAL at 08:51

## 2024-08-08 RX ADMIN — LORAZEPAM 2 MG: 1 TABLET ORAL at 09:51

## 2024-08-08 RX ADMIN — DIVALPROEX SODIUM 500 MG: 250 TABLET, FILM COATED, EXTENDED RELEASE ORAL at 08:50

## 2024-08-08 NOTE — BSMART NOTE
Crisis Noted: Call placed to IRIS, [phone], re: updated on disposition; awaiting return call.     08/07/24 @ 23:55 pm  IRIS Briones, [phone], returned the call and verbalized that patient will be returning to the group home when discharged from [facility]. Patient still denied thoughts of harm towards self or others, denied hallucinations or feelings of paranoia. Patient does not exhibit psychotic behavior; will assist as needed.

## 2024-08-08 NOTE — PROGRESS NOTES
Spiritual Health Assessment/Progress Note  [facility]    Crisis (iv-sa-eje), Emotional distress,  , Initial Encounter    Name: Trery Mcclain MRN: [id number]    Age: 28 y.o.     Sex: male   Language: English   Restorationism: Hindu   <principal problem not specified>     Date: 8/8/2024            Total Time Calculated: 9 min              Spiritual Assessment began in [facility] EMERGENCY DEPT        Referral/Consult From: Rounding   Encounter Overview/Reason: Crisis (iv-sa-eje)  Service Provided For: Patient (new admit suicide attempt)    Cheri, Belief, Meaning:   Patient Other: patient reports that he is not active in a local Baptist facility.  Family/Friends No family/friends present      Importance and Influence:  Patient Other: no report of activity in a Buddhism facility.  Family/Friends no family/friends present    Community:  Patient Other: no report of involvement in a cheri group.  Family/Friends Other: no report of involvement in a cheri community.    Assessment and Plan of Care:     Patient Interventions include: Other: no report of any cheri involvement.  Family/Friends Interventions include: Other: no family present.    Patient Plan of Care: Spiritual Care available upon further referral  Family/Friends Plan of Care: Spiritual Care available upon further referral    Electronically signed by Jeromy Farrell Jr., [facility] on 8/8/2024 at 11:30 AM

## 2024-08-08 NOTE — CARE COORDINATION
CM order received, met with the pt at the bedside, demographics verified. Pt states he lives at a group home, he states JFS is his guardian. Reached out to the Anali [phone], stated pt can return to his group home. Called Hafsa [phone] caregiver at the group home to request for transport pt, she stated she is unable to transport pt at this time. ED  to call medicaid transport for the pt. Dr. Murphy and primary nurse David made aware of dispo plan.    No further needs identified at this time. Case management remains available as needed.     Genie Shah BSN RN  Case Management  [phone]

## 2024-08-08 NOTE — ED NOTES
Assumed care of patient from BERNICE Aguero. Patient resting in bed in a position of comfort with eyes cloosed.

## 2024-09-30 ENCOUNTER — HOSPITAL ENCOUNTER (EMERGENCY)
Facility: HOSPITAL | Age: 29
Discharge: HOME | End: 2024-09-30
Payer: COMMERCIAL

## 2024-09-30 VITALS
TEMPERATURE: 97.9 F | OXYGEN SATURATION: 94 % | RESPIRATION RATE: 18 BRPM | DIASTOLIC BLOOD PRESSURE: 100 MMHG | BODY MASS INDEX: 31.5 KG/M2 | HEART RATE: 100 BPM | HEIGHT: 71 IN | WEIGHT: 225 LBS | SYSTOLIC BLOOD PRESSURE: 140 MMHG

## 2024-09-30 DIAGNOSIS — R44.0 AUDITORY HALLUCINATIONS: Primary | ICD-10-CM

## 2024-09-30 DIAGNOSIS — E86.0 DEHYDRATION: ICD-10-CM

## 2024-09-30 LAB
ALBUMIN SERPL-MCNC: 4.1 G/DL (ref 3.5–5)
ALBUMIN/GLOB SERPL: 1.2 (ref 1.1–2.2)
ALP SERPL-CCNC: 79 U/L (ref 45–117)
ALT SERPL-CCNC: 23 U/L (ref 12–78)
AMPHET UR QL SCN: NEGATIVE
ANION GAP SERPL CALC-SCNC: 7 MMOL/L (ref 2–12)
APPEARANCE UR: CLEAR
AST SERPL W P-5'-P-CCNC: 16 U/L (ref 15–37)
BACTERIA URNS QL MICRO: NEGATIVE /HPF
BARBITURATES UR QL SCN: NEGATIVE
BENZODIAZ UR QL: NEGATIVE
BILIRUB SERPL-MCNC: 0.8 MG/DL (ref 0.2–1)
BILIRUB UR QL: NEGATIVE
BUN SERPL-MCNC: 15 MG/DL (ref 6–20)
BUN/CREAT SERPL: 29 (ref 12–20)
CA-I BLD-MCNC: 9.4 MG/DL (ref 8.5–10.1)
CANNABINOIDS UR QL SCN: NEGATIVE
CHLORIDE SERPL-SCNC: 105 MMOL/L (ref 97–108)
CO2 SERPL-SCNC: 26 MMOL/L (ref 21–32)
COCAINE UR QL SCN: NEGATIVE
COLOR UR: ABNORMAL
CREAT SERPL-MCNC: 0.52 MG/DL (ref 0.7–1.3)
EPITH CASTS URNS QL MICRO: ABNORMAL /LPF
ERYTHROCYTE [DISTWIDTH] IN BLOOD BY AUTOMATED COUNT: 13.1 % (ref 11.5–14.5)
ETHANOL SERPL-MCNC: <10 MG/DL (ref 0–0.08)
GLOBULIN SER CALC-MCNC: 3.5 G/DL (ref 2–4)
GLUCOSE SERPL-MCNC: 95 MG/DL (ref 65–100)
GLUCOSE UR STRIP.AUTO-MCNC: >500 MG/DL
HCT VFR BLD AUTO: 42.8 % (ref 36.6–50.3)
HGB BLD-MCNC: 14.5 G/DL (ref 12.1–17)
HGB UR QL STRIP: NEGATIVE
KETONES UR QL STRIP.AUTO: 5 MG/DL
LEUKOCYTE ESTERASE UR QL STRIP.AUTO: NEGATIVE
Lab: NORMAL
MCH RBC QN AUTO: 27.4 PG (ref 26–34)
MCHC RBC AUTO-ENTMCNC: 33.9 G/DL (ref 30–36.5)
MCV RBC AUTO: 80.8 FL (ref 80–99)
METHADONE UR QL: NEGATIVE
NITRITE UR QL STRIP.AUTO: NEGATIVE
NRBC # BLD: 0 K/UL (ref 0–0.01)
NRBC BLD-RTO: 0 PER 100 WBC
OPIATES UR QL: NEGATIVE
PCP UR QL: NEGATIVE
PH UR STRIP: 6 (ref 5–8)
PLATELET # BLD AUTO: 326 K/UL (ref 150–400)
PMV BLD AUTO: 10 FL (ref 8.9–12.9)
POTASSIUM SERPL-SCNC: 3.2 MMOL/L (ref 3.5–5.1)
PROT SERPL-MCNC: 7.6 G/DL (ref 6.4–8.2)
PROT UR STRIP-MCNC: NEGATIVE MG/DL
RBC # BLD AUTO: 5.3 M/UL (ref 4.1–5.7)
RBC #/AREA URNS HPF: ABNORMAL /HPF (ref 0–5)
SODIUM SERPL-SCNC: 138 MMOL/L (ref 136–145)
SP GR UR REFRACTOMETRY: >1.03 (ref 1–1.03)
URINE CULTURE IF INDICATED: ABNORMAL
UROBILINOGEN UR QL STRIP.AUTO: 0.1 EU/DL (ref 0.1–1)
WBC # BLD AUTO: 11.2 K/UL (ref 4.1–11.1)
WBC URNS QL MICRO: ABNORMAL /HPF (ref 0–4)

## 2024-09-30 PROCEDURE — 82077 ASSAY SPEC XCP UR&BREATH IA: CPT

## 2024-09-30 PROCEDURE — 36415 COLL VENOUS BLD VENIPUNCTURE: CPT

## 2024-09-30 PROCEDURE — 85027 COMPLETE CBC AUTOMATED: CPT

## 2024-09-30 PROCEDURE — 81001 URINALYSIS AUTO W/SCOPE: CPT

## 2024-09-30 PROCEDURE — 80307 DRUG TEST PRSMV CHEM ANLYZR: CPT

## 2024-09-30 PROCEDURE — 99285 EMERGENCY DEPT VISIT HI MDM: CPT

## 2024-09-30 PROCEDURE — 80053 COMPREHEN METABOLIC PANEL: CPT

## 2024-09-30 PROCEDURE — 6370000000 HC RX 637 (ALT 250 FOR IP): Performed by: PHYSICIAN ASSISTANT

## 2024-09-30 RX ORDER — HYDROXYZINE HYDROCHLORIDE 25 MG/1
50 TABLET, FILM COATED ORAL
Status: COMPLETED | OUTPATIENT
Start: 2024-09-30 | End: 2024-09-30

## 2024-09-30 RX ORDER — HYDROXYZINE HYDROCHLORIDE 25 MG/1
25 TABLET, FILM COATED ORAL EVERY 8 HOURS PRN
Qty: 30 TABLET | Refills: 0 | Status: SHIPPED | OUTPATIENT
Start: 2024-09-30

## 2024-09-30 RX ORDER — MELATONIN 5 MG
5 TABLET,CHEWABLE ORAL NIGHTLY PRN
Qty: 12 TABLET | Refills: 0 | Status: SHIPPED | OUTPATIENT
Start: 2024-09-30

## 2024-09-30 RX ADMIN — HYDROXYZINE HYDROCHLORIDE 50 MG: 25 TABLET, FILM COATED ORAL at 22:22

## 2024-09-30 ASSESSMENT — PAIN - FUNCTIONAL ASSESSMENT
PAIN_FUNCTIONAL_ASSESSMENT: NONE - DENIES PAIN
PAIN_FUNCTIONAL_ASSESSMENT: NONE - DENIES PAIN
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
## 2024-09-30 NOTE — ED TRIAGE NOTES
Pt reports hearing voices telling hi to harm himself and others, pt reports he normally hears voices but they do not tell him to harm himself and others. Pt states voices are telling him to \"bang my head against a wall\" and \"put my hands on others\"  pt is with staff from [facility], staff reports pt just arrived to facility today.
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
## 2024-10-02 ENCOUNTER — HOSPITAL ENCOUNTER (EMERGENCY)
Facility: HOSPITAL | Age: 29
Discharge: HOME | End: 2024-10-02
Attending: STUDENT IN AN ORGANIZED HEALTH CARE EDUCATION/TRAINING PROGRAM
Payer: COMMERCIAL

## 2024-10-02 VITALS
DIASTOLIC BLOOD PRESSURE: 86 MMHG | BODY MASS INDEX: 31.5 KG/M2 | HEART RATE: 85 BPM | SYSTOLIC BLOOD PRESSURE: 145 MMHG | OXYGEN SATURATION: 98 % | TEMPERATURE: 98.8 F | RESPIRATION RATE: 17 BRPM | HEIGHT: 71 IN | WEIGHT: 225 LBS

## 2024-10-02 DIAGNOSIS — R45.850 HOMICIDAL IDEATION: ICD-10-CM

## 2024-10-02 DIAGNOSIS — R45.851 SUICIDAL IDEATION: Primary | ICD-10-CM

## 2024-10-02 LAB
ALBUMIN SERPL-MCNC: 4.3 G/DL (ref 3.5–5)
ALBUMIN/GLOB SERPL: 1.2 (ref 1.1–2.2)
ALP SERPL-CCNC: 87 U/L (ref 45–117)
ALT SERPL-CCNC: 29 U/L (ref 12–78)
AMPHET UR QL SCN: NEGATIVE
ANION GAP SERPL CALC-SCNC: 7 MMOL/L (ref 2–12)
AST SERPL W P-5'-P-CCNC: 15 U/L (ref 15–37)
BARBITURATES UR QL SCN: NEGATIVE
BASOPHILS # BLD: 0 K/UL (ref 0–0.1)
BASOPHILS NFR BLD: 1 % (ref 0–1)
BENZODIAZ UR QL: NEGATIVE
BILIRUB SERPL-MCNC: 0.8 MG/DL (ref 0.2–1)
BUN SERPL-MCNC: 10 MG/DL (ref 6–20)
BUN/CREAT SERPL: 16 (ref 12–20)
CA-I BLD-MCNC: 9.5 MG/DL (ref 8.5–10.1)
CANNABINOIDS UR QL SCN: NEGATIVE
CHLORIDE SERPL-SCNC: 107 MMOL/L (ref 97–108)
CO2 SERPL-SCNC: 28 MMOL/L (ref 21–32)
COCAINE UR QL SCN: NEGATIVE
CREAT SERPL-MCNC: 0.63 MG/DL (ref 0.7–1.3)
DIFFERENTIAL METHOD BLD: NORMAL
EOSINOPHIL # BLD: 0.1 K/UL (ref 0–0.4)
EOSINOPHIL NFR BLD: 1 % (ref 0–7)
ERYTHROCYTE [DISTWIDTH] IN BLOOD BY AUTOMATED COUNT: 13.2 % (ref 11.5–14.5)
ETHANOL SERPL-MCNC: <10 MG/DL (ref 0–0.08)
GLOBULIN SER CALC-MCNC: 3.7 G/DL (ref 2–4)
GLUCOSE SERPL-MCNC: 97 MG/DL (ref 65–100)
HCT VFR BLD AUTO: 45.9 % (ref 36.6–50.3)
HGB BLD-MCNC: 15 G/DL (ref 12.1–17)
IMM GRANULOCYTES # BLD AUTO: 0 K/UL (ref 0–0.04)
IMM GRANULOCYTES NFR BLD AUTO: 0 % (ref 0–0.5)
LYMPHOCYTES # BLD: 2.4 K/UL (ref 0.8–3.5)
LYMPHOCYTES NFR BLD: 38 % (ref 12–49)
Lab: NORMAL
MCH RBC QN AUTO: 26.9 PG (ref 26–34)
MCHC RBC AUTO-ENTMCNC: 32.7 G/DL (ref 30–36.5)
MCV RBC AUTO: 82.4 FL (ref 80–99)
METHADONE UR QL: NEGATIVE
MONOCYTES # BLD: 0.8 K/UL (ref 0–1)
MONOCYTES NFR BLD: 13 % (ref 5–13)
NEUTS SEG # BLD: 3 K/UL (ref 1.8–8)
NEUTS SEG NFR BLD: 47 % (ref 32–75)
NRBC # BLD: 0 K/UL (ref 0–0.01)
NRBC BLD-RTO: 0 PER 100 WBC
OPIATES UR QL: NEGATIVE
PCP UR QL: NEGATIVE
PLATELET # BLD AUTO: 296 K/UL (ref 150–400)
PMV BLD AUTO: 9.9 FL (ref 8.9–12.9)
POTASSIUM SERPL-SCNC: 3.4 MMOL/L (ref 3.5–5.1)
PROT SERPL-MCNC: 8 G/DL (ref 6.4–8.2)
RBC # BLD AUTO: 5.57 M/UL (ref 4.1–5.7)
SODIUM SERPL-SCNC: 142 MMOL/L (ref 136–145)
WBC # BLD AUTO: 6.3 K/UL (ref 4.1–11.1)

## 2024-10-02 PROCEDURE — 80307 DRUG TEST PRSMV CHEM ANLYZR: CPT

## 2024-10-02 PROCEDURE — 99285 EMERGENCY DEPT VISIT HI MDM: CPT

## 2024-10-02 PROCEDURE — 36415 COLL VENOUS BLD VENIPUNCTURE: CPT

## 2024-10-02 PROCEDURE — 85025 COMPLETE CBC W/AUTO DIFF WBC: CPT

## 2024-10-02 PROCEDURE — 82077 ASSAY SPEC XCP UR&BREATH IA: CPT

## 2024-10-02 PROCEDURE — 80053 COMPREHEN METABOLIC PANEL: CPT

## 2024-10-02 ASSESSMENT — LIFESTYLE VARIABLES
HOW MANY STANDARD DRINKS CONTAINING ALCOHOL DO YOU HAVE ON A TYPICAL DAY: PATIENT DOES NOT DRINK
HOW OFTEN DO YOU HAVE A DRINK CONTAINING ALCOHOL: NEVER

## 2024-10-02 ASSESSMENT — PAIN - FUNCTIONAL ASSESSMENT
PAIN_FUNCTIONAL_ASSESSMENT: NONE - DENIES PAIN
PAIN_FUNCTIONAL_ASSESSMENT: NONE - DENIES PAIN
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
## 2024-10-02 NOTE — ED NOTES
Pt states he is no longer SI/HI and is requesting to speak to [facility]. Group home managers in room.

## 2024-10-02 NOTE — ED NOTES
Pt placed in green gown, safety tray ordered, and belongings placed in locker 23 with patient labels.
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
## 2024-10-03 NOTE — DISCHARGE INSTRUCTIONS
Thank you for choosing our Emergency Department for your care.  It is our privilege to care for you in your time of need.  In the next several days, you may receive a survey via email or mailed to your home about your experience with our team.  We would greatly appreciate you taking a few minutes to complete the survey, as we use this information to learn what we have done well and what we could be doing better. Thank you for trusting us with your care!    Below you will find a list of your tests from today's visit.   Labs  Recent Results (from the past 12 hour(s))   CBC with Auto Differential    Collection Time: 10/02/24  5:17 PM   Result Value Ref Range    WBC 6.3 4.1 - 11.1 K/uL    RBC 5.57 4.10 - 5.70 M/uL    Hemoglobin 15.0 12.1 - 17.0 g/dL    Hematocrit 45.9 36.6 - 50.3 %    MCV 82.4 80.0 - 99.0 FL    MCH 26.9 26.0 - 34.0 PG    MCHC 32.7 30.0 - 36.5 g/dL    RDW 13.2 11.5 - 14.5 %    Platelets 296 150 - 400 K/uL    MPV 9.9 8.9 - 12.9 FL    Nucleated RBCs 0.0 0.0  WBC    nRBC 0.00 0.00 - 0.01 K/uL    Neutrophils % 47 32 - 75 %    Lymphocytes % 38 12 - 49 %    Monocytes % 13 5 - 13 %    Eosinophils % 1 0 - 7 %    Basophils % 1 0 - 1 %    Immature Granulocytes % 0 0 - 0.5 %    Neutrophils Absolute 3.0 1.8 - 8.0 K/UL    Lymphocytes Absolute 2.4 0.8 - 3.5 K/UL    Monocytes Absolute 0.8 0.0 - 1.0 K/UL    Eosinophils Absolute 0.1 0.0 - 0.4 K/UL    Basophils Absolute 0.0 0.0 - 0.1 K/UL    Immature Granulocytes Absolute 0.0 0.00 - 0.04 K/UL    Differential Type AUTOMATED     Comprehensive Metabolic Panel    Collection Time: 10/02/24  5:17 PM   Result Value Ref Range    Sodium 142 136 - 145 mmol/L    Potassium 3.4 (L) 3.5 - 5.1 mmol/L    Chloride 107 97 - 108 mmol/L    CO2 28 21 - 32 mmol/L    Anion Gap 7 2 - 12 mmol/L    Glucose 97 65 - 100 mg/dL    BUN 10 6 - 20 mg/dL    Creatinine 0.63 (L) 0.70 - 1.30 mg/dL    BUN/Creatinine Ratio 16 12 - 20      Est, Glom Filt Rate >90 >60 ml/min/1.73m2    Calcium 9.5 8.5

## 2024-10-11 ENCOUNTER — HOSPITAL ENCOUNTER (EMERGENCY)
Facility: HOSPITAL | Age: 29
Discharge: HOME | End: 2024-10-13
Attending: STUDENT IN AN ORGANIZED HEALTH CARE EDUCATION/TRAINING PROGRAM
Payer: COMMERCIAL

## 2024-10-11 DIAGNOSIS — R45.851 SUICIDAL IDEATION: Primary | ICD-10-CM

## 2024-10-11 LAB
AMPHET UR QL SCN: NEGATIVE
ANION GAP SERPL CALC-SCNC: 9 MMOL/L (ref 2–12)
APPEARANCE UR: CLEAR
BACTERIA URNS QL MICRO: NEGATIVE /HPF
BARBITURATES UR QL SCN: NEGATIVE
BASOPHILS # BLD: 0.1 K/UL (ref 0–0.1)
BASOPHILS NFR BLD: 1 % (ref 0–1)
BENZODIAZ UR QL: NEGATIVE
BILIRUB UR QL: NEGATIVE
BUN SERPL-MCNC: 14 MG/DL (ref 6–20)
BUN/CREAT SERPL: 19 (ref 12–20)
CA-I BLD-MCNC: 9.5 MG/DL (ref 8.5–10.1)
CANNABINOIDS UR QL SCN: NEGATIVE
CHLORIDE SERPL-SCNC: 106 MMOL/L (ref 97–108)
CO2 SERPL-SCNC: 24 MMOL/L (ref 21–32)
COCAINE UR QL SCN: NEGATIVE
COLOR UR: YELLOW
CREAT SERPL-MCNC: 0.72 MG/DL (ref 0.7–1.3)
DIFFERENTIAL METHOD BLD: NORMAL
EOSINOPHIL # BLD: 0.1 K/UL (ref 0–0.4)
EOSINOPHIL NFR BLD: 1 % (ref 0–7)
EPITH CASTS URNS QL MICRO: ABNORMAL /LPF
ERYTHROCYTE [DISTWIDTH] IN BLOOD BY AUTOMATED COUNT: 12.8 % (ref 11.5–14.5)
ETHANOL SERPL-MCNC: <10 MG/DL (ref 0–0.08)
GLUCOSE SERPL-MCNC: 135 MG/DL (ref 65–100)
GLUCOSE UR STRIP.AUTO-MCNC: NEGATIVE MG/DL
HCT VFR BLD AUTO: 44.8 % (ref 36.6–50.3)
HGB BLD-MCNC: 15.1 G/DL (ref 12.1–17)
HGB UR QL STRIP: NEGATIVE
IMM GRANULOCYTES # BLD AUTO: 0 K/UL (ref 0–0.04)
IMM GRANULOCYTES NFR BLD AUTO: 0 % (ref 0–0.5)
KETONES UR QL STRIP.AUTO: 5 MG/DL
LEUKOCYTE ESTERASE UR QL STRIP.AUTO: NEGATIVE
LYMPHOCYTES # BLD: 2.9 K/UL (ref 0.8–3.5)
LYMPHOCYTES NFR BLD: 34 % (ref 12–49)
Lab: NORMAL
MCH RBC QN AUTO: 27.2 PG (ref 26–34)
MCHC RBC AUTO-ENTMCNC: 33.7 G/DL (ref 30–36.5)
MCV RBC AUTO: 80.7 FL (ref 80–99)
METHADONE UR QL: NEGATIVE
MONOCYTES # BLD: 0.5 K/UL (ref 0–1)
MONOCYTES NFR BLD: 6 % (ref 5–13)
MUCOUS THREADS URNS QL MICRO: ABNORMAL /LPF
NEUTS SEG # BLD: 5 K/UL (ref 1.8–8)
NEUTS SEG NFR BLD: 58 % (ref 32–75)
NITRITE UR QL STRIP.AUTO: NEGATIVE
NRBC # BLD: 0 K/UL (ref 0–0.01)
NRBC BLD-RTO: 0 PER 100 WBC
OPIATES UR QL: NEGATIVE
PCP UR QL: NEGATIVE
PH UR STRIP: 6 (ref 5–8)
PLATELET # BLD AUTO: 244 K/UL (ref 150–400)
PMV BLD AUTO: 10.6 FL (ref 8.9–12.9)
POTASSIUM SERPL-SCNC: 3.8 MMOL/L (ref 3.5–5.1)
PROT UR STRIP-MCNC: 30 MG/DL
RBC # BLD AUTO: 5.55 M/UL (ref 4.1–5.7)
RBC #/AREA URNS HPF: ABNORMAL /HPF (ref 0–5)
SODIUM SERPL-SCNC: 139 MMOL/L (ref 136–145)
SP GR UR REFRACTOMETRY: >1.03 (ref 1–1.03)
UROBILINOGEN UR QL STRIP.AUTO: 4 EU/DL (ref 0.1–1)
WBC # BLD AUTO: 8.5 K/UL (ref 4.1–11.1)
WBC URNS QL MICRO: ABNORMAL /HPF (ref 0–4)

## 2024-10-11 PROCEDURE — 99284 EMERGENCY DEPT VISIT MOD MDM: CPT

## 2024-10-11 PROCEDURE — 80307 DRUG TEST PRSMV CHEM ANLYZR: CPT

## 2024-10-11 PROCEDURE — 82077 ASSAY SPEC XCP UR&BREATH IA: CPT

## 2024-10-11 PROCEDURE — 81001 URINALYSIS AUTO W/SCOPE: CPT

## 2024-10-11 PROCEDURE — 36415 COLL VENOUS BLD VENIPUNCTURE: CPT

## 2024-10-11 PROCEDURE — 85025 COMPLETE CBC W/AUTO DIFF WBC: CPT

## 2024-10-11 PROCEDURE — 80048 BASIC METABOLIC PNL TOTAL CA: CPT

## 2024-10-11 ASSESSMENT — PAIN - FUNCTIONAL ASSESSMENT: PAIN_FUNCTIONAL_ASSESSMENT: NONE - DENIES PAIN

## 2024-10-11 NOTE — ED NOTES
Patient with SI. Patient wanded by security personnel, changed into green gown, all belongings removed and placed on the locker. Sitter at bed side 1:1. Waiting for BSMART assessment.

## 2024-10-12 PROCEDURE — 6360000002 HC RX W HCPCS: Performed by: EMERGENCY MEDICINE

## 2024-10-12 PROCEDURE — 6370000000 HC RX 637 (ALT 250 FOR IP): Performed by: EMERGENCY MEDICINE

## 2024-10-12 PROCEDURE — 96372 THER/PROPH/DIAG INJ SC/IM: CPT

## 2024-10-12 RX ORDER — LORAZEPAM 1 MG/1
1 TABLET ORAL EVERY 6 HOURS PRN
Status: DISCONTINUED | OUTPATIENT
Start: 2024-10-12 | End: 2024-10-13 | Stop reason: HOSPADM

## 2024-10-12 RX ORDER — HALOPERIDOL 5 MG/ML
5 INJECTION INTRAMUSCULAR ONCE
Status: COMPLETED | OUTPATIENT
Start: 2024-10-12 | End: 2024-10-12

## 2024-10-12 RX ORDER — LOPERAMIDE HCL 2 MG
2 CAPSULE ORAL
Status: COMPLETED | OUTPATIENT
Start: 2024-10-12 | End: 2024-10-12

## 2024-10-12 RX ORDER — LORAZEPAM 1 MG/1
1 TABLET ORAL ONCE
Status: COMPLETED | OUTPATIENT
Start: 2024-10-12 | End: 2024-10-12

## 2024-10-12 RX ORDER — TRAZODONE HYDROCHLORIDE 50 MG/1
50 TABLET, FILM COATED ORAL NIGHTLY
Status: DISCONTINUED | OUTPATIENT
Start: 2024-10-12 | End: 2024-10-13 | Stop reason: HOSPADM

## 2024-10-12 RX ORDER — HYDROXYZINE PAMOATE 25 MG/1
25 CAPSULE ORAL
Status: COMPLETED | OUTPATIENT
Start: 2024-10-12 | End: 2024-10-12

## 2024-10-12 RX ADMIN — LORAZEPAM 1 MG: 1 TABLET ORAL at 20:08

## 2024-10-12 RX ADMIN — TRAZODONE HYDROCHLORIDE 50 MG: 50 TABLET ORAL at 20:08

## 2024-10-12 RX ADMIN — LOPERAMIDE HYDROCHLORIDE 2 MG: 2 CAPSULE ORAL at 09:11

## 2024-10-12 RX ADMIN — HALOPERIDOL LACTATE 5 MG: 5 INJECTION, SOLUTION INTRAMUSCULAR at 15:46

## 2024-10-12 RX ADMIN — HYDROXYZINE PAMOATE 25 MG: 25 CAPSULE ORAL at 09:11

## 2024-10-12 RX ADMIN — LORAZEPAM 1 MG: 1 TABLET ORAL at 10:34

## 2024-10-12 NOTE — ED PROVIDER NOTES
Progress Note on Active Behavioral Health or Case Management Hold    I received signout from Doctor Adame  on Terry Mcclain and have assumed care while this patient is in our Emergency Department. Please see the below progress notes from the prior providers and myself.    ED Course as of 10/12/24 0719   Fri Oct 11, 2024   1934 MDM: 29-year-old male presents for evaluation of suicidal and homicidal ideation.  Will have patient evaluated by behavioral health services to determine if he meets inpatient crisis stabilization criteria given concern for him acting on these thoughts.  Medical screening labs to include CBC, CMP, ethanol and UDS [BQ]   2129 Medically clear [BQ]   2334 Received handoff by Dr Adame -awaiting final disposition by psych [KK]   Sat Oct 12, 2024   0112 Will be assessed by [facility] tomorrow morning to present case tomorrow morning around 9am [KK]      ED Course User Index  [BQ] Jace Adame MD  [KK] Gus Singleton MD       Provider: Gus Singleton MD   Note Started: 11:27 PM EDT 10/11/24       Gus Singleton MD  10/12/24 0719

## 2024-10-12 NOTE — ED NOTES
Patient pulled me into the room and states he wants to talk to D19.So MD was advised and ordered ativan and [facility] intake was notified to come evaluate the patient.

## 2024-10-12 NOTE — ED NOTES
Pt called this nurse to his room. Pt stating that he wants to be under [employer] because he does not feel safe without an officer.   At this time pt in room speaking with other RN

## 2024-10-12 NOTE — PROGRESS NOTES
Spiritual Health History and Assessment/Progress Note  [facility]    Initial Encounter, Spiritual/Emotional Needs, Loneliness/Social Isolation,  ,  ,      Name: Terry Mcclain MRN: [id number]    Age: 29 y.o.     Sex: male   Language: English   Moravian: Buddhism   <principal problem not specified>     Date: 10/12/2024            Total Time Calculated: 16 min              Spiritual Assessment began in [facility] EMERGENCY DEPT        Referral/Consult From: Patient   Encounter Overview/Reason: Initial Encounter, Spiritual/Emotional Needs, Loneliness/Social Isolation  Service Provided For: Patient    Cheri, Belief, Meaning:   Patient identifies as spiritual  Family/Friends No family/friends present      Importance and Influence:  Patient has no beliefs influential to healthcare decision-making identified during this visit  Family/Friends No family/friends present    Community:  Patient expresses feelings of isolation: disconnected from family/friends, feeling there is no one to turn to for help, and feeling no one understands  Family/Friends No family/friends present    Assessment and Plan of Care:     Patient Interventions include: Facilitated expression of thoughts and feelings, Explored spiritual coping/struggle/distress, Engaged in theological reflection, Affirmed coping skills/support systems, Provided sacramental/Taoism ritual, and Facilitated life review and/ or legacy  Family/Friends Interventions include: No family/friends present    Patient Plan of Care: Spiritual Care available upon further referral  Family/Friends Plan of Care: Spiritual Care available upon further referral     responded to Pt's request, provided active listening, comforting presence and prayers. Pt expressed feelings of loneliness and desertion by family and friends. He believes that no one understands his needs due to his health status. He wishes to be reunited with  his family. He finds meaning in relationships and

## 2024-10-12 NOTE — ED PROVIDER NOTES
[facility] EMERGENCY DEPT  EMERGENCY DEPARTMENT HISTORY AND PHYSICAL EXAM      Date: 10/11/2024  Patient Name: Terry Mcclain  MRN: [id number]  Birthdate 1995  Date of evaluation: 10/11/2024  Provider: Jace Adame MD   Note Started: 9:28 PM EDT 10/11/24    HISTORY OF PRESENT ILLNESS     Chief Complaint   Patient presents with    Suicidal    Homicidal    Hallucinations       History Provided By: Patient    HPI: Terry Mcclain is a 29 y.o. male with past medical history as reviewed below presents for evaluation of suicidal ideation.  Patient states that he is suicidal and homicidal, states he has multiple psychosocial stressors including his housing situation.  He denies any attempts to hurt himself.    PAST MEDICAL HISTORY   Past Medical History:  Past Medical History:   Diagnosis Date    Acquired intellectual disability     Anxiety     Asthma     Bipolar disorder with psychotic features (HCC)     Hypertension     Multiple personality disorder (HCC)     Psychiatric disorder     schitzoeffective disorder, depression, PTSD    PTSD (post-traumatic stress disorder)        Past Surgical History:  No past surgical history on file.    Family History:  No family history on file.    Social History:  Social History     Tobacco Use    Smoking status: Every Day    Smokeless tobacco: Never   Substance Use Topics    Alcohol use: No    Drug use: Yes     Types: Marijuana (Weed)       Allergies:  Allergies   Allergen Reactions    Amoxicillin Anaphylaxis    Fish Allergy Anaphylaxis    Penicillins Anaphylaxis    Shellfish-Derived Products Anaphylaxis       PCP: No primary care provider on file.    Current Meds:   No current facility-administered medications for this encounter.     Current Outpatient Medications   Medication Sig Dispense Refill    hydrOXYzine HCl (ATARAX) 25 MG tablet Take 1 tablet by mouth every 8 hours as needed for Itching 30 tablet 0    Melatonin 5 MG CHEW Take 5 mg by mouth nightly as needed (sleep) 12 tablet 0

## 2024-10-13 VITALS
BODY MASS INDEX: 30.8 KG/M2 | OXYGEN SATURATION: 98 % | HEIGHT: 71 IN | SYSTOLIC BLOOD PRESSURE: 123 MMHG | WEIGHT: 220 LBS | RESPIRATION RATE: 14 BRPM | TEMPERATURE: 97.5 F | HEART RATE: 88 BPM | DIASTOLIC BLOOD PRESSURE: 82 MMHG

## 2024-10-13 LAB
GLUCOSE BLD STRIP.AUTO-MCNC: 187 MG/DL (ref 65–100)
PERFORMED BY:: ABNORMAL

## 2024-10-13 PROCEDURE — 82962 GLUCOSE BLOOD TEST: CPT

## 2024-10-13 PROCEDURE — 6370000000 HC RX 637 (ALT 250 FOR IP): Performed by: EMERGENCY MEDICINE

## 2024-10-13 RX ORDER — DIVALPROEX SODIUM 500 MG/1
500 TABLET, DELAYED RELEASE ORAL EVERY 12 HOURS SCHEDULED
Status: DISCONTINUED | OUTPATIENT
Start: 2024-10-13 | End: 2024-10-13 | Stop reason: HOSPADM

## 2024-10-13 RX ORDER — GLYCOPYRROLATE 1 MG/1
1 TABLET ORAL 3 TIMES DAILY
Status: DISCONTINUED | OUTPATIENT
Start: 2024-10-13 | End: 2024-10-13 | Stop reason: HOSPADM

## 2024-10-13 RX ORDER — ALBUTEROL SULFATE 90 UG/1
2 INHALANT RESPIRATORY (INHALATION) EVERY 6 HOURS PRN
Status: DISCONTINUED | OUTPATIENT
Start: 2024-10-13 | End: 2024-10-13 | Stop reason: HOSPADM

## 2024-10-13 RX ORDER — ALOGLIPTIN 25 MG/1
25 TABLET, FILM COATED ORAL DAILY
Status: DISCONTINUED | OUTPATIENT
Start: 2024-10-13 | End: 2024-10-13 | Stop reason: HOSPADM

## 2024-10-13 RX ORDER — AMLODIPINE BESYLATE 5 MG/1
10 TABLET ORAL DAILY
Status: DISCONTINUED | OUTPATIENT
Start: 2024-10-13 | End: 2024-10-13 | Stop reason: HOSPADM

## 2024-10-13 RX ORDER — TRAZODONE HYDROCHLORIDE 50 MG/1
50 TABLET, FILM COATED ORAL NIGHTLY PRN
Qty: 30 TABLET | Refills: 0 | Status: SHIPPED | OUTPATIENT
Start: 2024-10-13 | End: 2024-11-12

## 2024-10-13 RX ORDER — LURASIDONE HYDROCHLORIDE 80 MG/1
80 TABLET, FILM COATED ORAL
Status: DISCONTINUED | OUTPATIENT
Start: 2024-10-13 | End: 2024-10-13 | Stop reason: HOSPADM

## 2024-10-13 RX ORDER — METOPROLOL TARTRATE 50 MG
50 TABLET ORAL 2 TIMES DAILY
Status: DISCONTINUED | OUTPATIENT
Start: 2024-10-13 | End: 2024-10-13 | Stop reason: HOSPADM

## 2024-10-13 RX ORDER — ACETAMINOPHEN 325 MG/1
650 TABLET ORAL EVERY 4 HOURS PRN
Status: DISCONTINUED | OUTPATIENT
Start: 2024-10-13 | End: 2024-10-13 | Stop reason: HOSPADM

## 2024-10-13 RX ORDER — ATORVASTATIN CALCIUM 20 MG/1
20 TABLET, FILM COATED ORAL DAILY
Status: DISCONTINUED | OUTPATIENT
Start: 2024-10-13 | End: 2024-10-13 | Stop reason: HOSPADM

## 2024-10-13 RX ORDER — IBUPROFEN 100 MG/5ML
200 SUSPENSION, ORAL (FINAL DOSE FORM) ORAL EVERY 6 HOURS PRN
Status: DISCONTINUED | OUTPATIENT
Start: 2024-10-13 | End: 2024-10-13 | Stop reason: HOSPADM

## 2024-10-13 RX ORDER — PRAZOSIN HYDROCHLORIDE 1 MG/1
1 CAPSULE ORAL NIGHTLY
Status: DISCONTINUED | OUTPATIENT
Start: 2024-10-13 | End: 2024-10-13 | Stop reason: HOSPADM

## 2024-10-13 RX ORDER — LURASIDONE HYDROCHLORIDE 80 MG/1
80 TABLET, FILM COATED ORAL
Qty: 30 TABLET | Refills: 0 | Status: SHIPPED | OUTPATIENT
Start: 2024-10-13

## 2024-10-13 RX ORDER — NICOTINE 21 MG/24HR
1 PATCH, TRANSDERMAL 24 HOURS TRANSDERMAL DAILY
Qty: 28 PATCH | Refills: 0 | Status: SHIPPED | OUTPATIENT
Start: 2024-10-13 | End: 2024-11-10

## 2024-10-13 RX ORDER — LOSARTAN POTASSIUM 50 MG/1
100 TABLET ORAL DAILY
Status: DISCONTINUED | OUTPATIENT
Start: 2024-10-13 | End: 2024-10-13 | Stop reason: HOSPADM

## 2024-10-13 RX ORDER — TRAZODONE HYDROCHLORIDE 50 MG/1
50 TABLET, FILM COATED ORAL NIGHTLY
Status: DISCONTINUED | OUTPATIENT
Start: 2024-10-13 | End: 2024-10-13 | Stop reason: SDUPTHER

## 2024-10-13 RX ORDER — SERTRALINE HYDROCHLORIDE 25 MG/1
100 TABLET, FILM COATED ORAL DAILY
Status: DISCONTINUED | OUTPATIENT
Start: 2024-10-13 | End: 2024-10-13 | Stop reason: HOSPADM

## 2024-10-13 RX ORDER — PANTOPRAZOLE SODIUM 40 MG/1
40 TABLET, DELAYED RELEASE ORAL
Status: DISCONTINUED | OUTPATIENT
Start: 2024-10-13 | End: 2024-10-13 | Stop reason: HOSPADM

## 2024-10-13 RX ORDER — LACTOBACILLUS RHAMNOSUS GG 10B CELL
1 CAPSULE ORAL
Status: DISCONTINUED | OUTPATIENT
Start: 2024-10-13 | End: 2024-10-13 | Stop reason: HOSPADM

## 2024-10-13 RX ORDER — ALLOPURINOL 100 MG/1
100 TABLET ORAL 2 TIMES DAILY
Status: DISCONTINUED | OUTPATIENT
Start: 2024-10-13 | End: 2024-10-13 | Stop reason: HOSPADM

## 2024-10-13 RX ORDER — VITAMIN B COMPLEX
2000 TABLET ORAL DAILY
Status: DISCONTINUED | OUTPATIENT
Start: 2024-10-13 | End: 2024-10-13 | Stop reason: HOSPADM

## 2024-10-13 RX ORDER — FENOFIBRATE 160 MG/1
160 TABLET ORAL DAILY
Status: DISCONTINUED | OUTPATIENT
Start: 2024-10-13 | End: 2024-10-13 | Stop reason: HOSPADM

## 2024-10-13 RX ORDER — DOCUSATE SODIUM 100 MG/1
100 CAPSULE, LIQUID FILLED ORAL 2 TIMES DAILY
Status: DISCONTINUED | OUTPATIENT
Start: 2024-10-13 | End: 2024-10-13 | Stop reason: HOSPADM

## 2024-10-13 RX ORDER — FLUTICASONE PROPIONATE 50 MCG
2 SPRAY, SUSPENSION (ML) NASAL DAILY
Status: DISCONTINUED | OUTPATIENT
Start: 2024-10-13 | End: 2024-10-13 | Stop reason: HOSPADM

## 2024-10-13 RX ORDER — DIPHENHYDRAMINE HCL 25 MG
50 CAPSULE ORAL EVERY 6 HOURS PRN
Status: DISCONTINUED | OUTPATIENT
Start: 2024-10-13 | End: 2024-10-13 | Stop reason: HOSPADM

## 2024-10-13 RX ORDER — CALCIUM CARBONATE 500 MG/1
500 TABLET, CHEWABLE ORAL 3 TIMES DAILY PRN
Status: DISCONTINUED | OUTPATIENT
Start: 2024-10-13 | End: 2024-10-13 | Stop reason: HOSPADM

## 2024-10-13 RX ADMIN — AMLODIPINE BESYLATE 10 MG: 5 TABLET ORAL at 08:11

## 2024-10-13 RX ADMIN — ATORVASTATIN CALCIUM 20 MG: 20 TABLET, FILM COATED ORAL at 08:09

## 2024-10-13 RX ADMIN — ALLOPURINOL 100 MG: 100 TABLET ORAL at 08:11

## 2024-10-13 RX ADMIN — LORAZEPAM 1 MG: 1 TABLET ORAL at 08:10

## 2024-10-13 RX ADMIN — FENOFIBRATE 160 MG: 160 TABLET ORAL at 08:36

## 2024-10-13 RX ADMIN — DIVALPROEX SODIUM 500 MG: 500 TABLET, DELAYED RELEASE ORAL at 08:11

## 2024-10-13 RX ADMIN — ALOGLIPTIN 25 MG: 25 TABLET, FILM COATED ORAL at 09:13

## 2024-10-13 RX ADMIN — METFORMIN HYDROCHLORIDE 850 MG: 850 TABLET ORAL at 08:36

## 2024-10-13 RX ADMIN — Medication 1 CAPSULE: at 08:37

## 2024-10-13 RX ADMIN — PANTOPRAZOLE SODIUM 40 MG: 40 TABLET, DELAYED RELEASE ORAL at 08:10

## 2024-10-13 RX ADMIN — FLUTICASONE PROPIONATE 2 SPRAY: 50 SPRAY, METERED NASAL at 09:13

## 2024-10-13 RX ADMIN — Medication 2000 UNITS: at 08:10

## 2024-10-13 RX ADMIN — SERTRALINE 100 MG: 25 TABLET, FILM COATED ORAL at 09:14

## 2024-10-13 RX ADMIN — DOCUSATE SODIUM 100 MG: 100 CAPSULE, LIQUID FILLED ORAL at 08:10

## 2024-10-13 RX ADMIN — LOSARTAN POTASSIUM 100 MG: 50 TABLET, FILM COATED ORAL at 08:11

## 2024-10-13 RX ADMIN — METOPROLOL TARTRATE 50 MG: 50 TABLET, FILM COATED ORAL at 08:11

## 2024-10-13 RX ADMIN — GLYCOPYRROLATE 1 MG: 1 TABLET ORAL at 09:13

## 2024-10-13 ASSESSMENT — PAIN - FUNCTIONAL ASSESSMENT
PAIN_FUNCTIONAL_ASSESSMENT: NONE - DENIES PAIN
PAIN_FUNCTIONAL_ASSESSMENT: NONE - DENIES PAIN

## 2024-10-13 NOTE — BSMART NOTE
[facility] Paperwork signed by Dr Antonio and writer faxed to [phone]       UPDATE 6:30 PM: spoke with Barbara from [facility] to confirm physical medications and compared to list faxed over.       UPDATE 6:50 PM Per Barbara there are several medications that need scripts. They can be filled by [facility] if there is a pharmacy, or send to [facility] on [address] in [place]. Medications that need a script:    Trazodone 50 MG PRN  Latuda 80 mg ONCE PER DAY  Nicotine Patches   Jardiance 25 MG daily  Duloxetine 60 MG Daily  Divalproex Dr 500 MG 2 x Day    Stated pt will need to have medications filled in order to have confirmed acceptance to [facility], Barbara will call back in the morning        
Birgit from [facility] arrived to ER to  pts medications.  This writer handed over 2 bags of medications without incident   
Per Sally with [facility], pt admission cannot be complete without medications.  She states that [facility] staff will come to the ER to  pt medications.  This writer contacted  (Jama with [facility]) and cancelled trip.  Maria Isabel with registration made aware  
Per Sally with [facility], pt arrived to [facility] without home medications.  This writer spoke with Rafiq QUEEN and figured out that medications were still locked in the pharmacy.  Per Sebastian, nursing supervisor, this writer contacted Kwame kendall who will  medications from ER registration area to transport to [facility].  This writer informed Maria Isabel with registration, who will call this writer back when  arrives.  
Per Sally with [facility], pt is clear to be transported to the [facility] house:    [address] [place] [address]    Rafiq QUEEN aware  
[facility] forms (medication form & TB Screening) given to attending Dr. Antonio. Writer  provided him with current medication list that was provided by Group Home and informed him all physical medications were at pt nurses station.  
Spoke with CLAUDIO RODRIGUEZ [facility] who stated pt is conditionally approved      Guardian signed paperwork and sent back  Group home informed to bring 7 days medications and clothing. Will transport pt to [facility] house upon final approval and discharge from ED.  Claudio will be emailing over paperwork for  To sign regarding meds and TB screening       UPDATE 11:12 AM spoke with MS MEDINA and CLAUDIO by phone, medication list to be brought over by [facility]  Group Home will drop off physical medication and clothes to hospital   MS MEDINA stated group home will NOT be transporting pt to [facility], medicaid transport will need to be set up  
This writer matched pt's discharge medication list provided by [facility] with the medications that [facility] brought to the ER.  The medications that were not brought, that are on the discharge list are Nicotine patches, Trazodone PRN and Latuda.  This writer spoke with Amy at [facility] group home who provided the group home managers information.  This writer then contacted Sae Green at [phone] who stated that the 3 medications that were not brought to the ER were called into [place] pharmacy (as indicated on the discharge MAR) and have NOT been picked up yet.  This writer attempted to contact [facility], however they are not open until 1300PM today.  This writer contacted [facility] clinician through the hotline, and per answering service, Sally will call this writer back  
This writer rounded on patient.  Patient agreed to talk with this writer and was informed of counselor's role.    The patient's appearance shows no evidence of impairment.  The patient's behavior shows no evidence of impairment. The patient is oriented to time, place, person and situation.  The patient's speech normal.  The patient's mood  is depressed 9/10, is anxious 8/10, and emotional.  The range of affect is labile.  The patient's thought content  demonstrates no evidence of impairment.  The thought process shows no evidence of impairment.  The patient's perception demonstrated changes in the following:  auditory  visual hallucinations \"voices telling me I'm better off dead than alive. Seeing shadows trying to kill me and my family.\" The patient's memory shows no evidence of impairment.  The patient's appetite shows no evidence of impairment.  The patient's sleep has evidence of insomnia. The patient's insight shows no evidence of impairment.  The patient's judgement is psychologically impaired.  Patient endorses suicidal thoughts with plan \"to cut wrist\" and denies homicidal ideation.   The plan is transfer to [facility].      Yulissa Wheatley, ZAK-R  RALPH COUNSELOR  
This writer spoke with Dr Antonio who will send over the prescriptions for Nicotine, Latuda, and Trazodone to [facility] on [address] in [place].  Sally with [facility] aware and will call this writer back with accepting information for [facility] house  
still overwhelmed and left the home after cursing at group home staff. He overheard the group home staff telling him they would contact the police due to him walking off. Pt reports that he was found by the police at [facility] and decided to come in voluntarily \"to talk to someone\". Pt is requesting to go to [facility] placement or inpatient hospitalization. Pt was discharged from [facility] today 10/12/2024. Pt endorsing he does not like the medication changes that were made. Pt states the MD took him off of his Haldol PRN for agitation and hydroxyzine PRN for anxiety.    This writer spoke to Ms. Norma,  who stated the pt returned home today at 2pm from inpatient treatment. He states he had told her hearing voices. She stated she called [facility] for him and overheard him talking to them about his medications. She stated the pt seemed fixated on his hydroxyzine. She stated the pt got upset and left the home after cursing at her. She states that the pt has multiple admissions for inpatient admissions, and typically displays difficulty self regulating in the community. She reports that she will need to confirm with the group home owner if he can return to the home. She states she spoke with [facility] who stated the pt may qualify for [facility] placement and they have open beds.       This writer also spoke with the [facility] clinician, Barbara Allen who confirmed that she spoke with the pt earlier when he had shared that he was hearing voices and didn't want to hurt anyone. She reported the pt frequently uses services and he was presenting at his baseline. Pt informed her that he was upset the doctor changed his meds of Vistaril and Haladol. Per Barbara allen, pt has hx diagnoses of borderline personality disorder, Mild ID and Schizoaffective disorder. Per EHR, pt has had 30 ER visits within the last year for similar complaints.    Pt met with Barbara Allen from [facility] for assessment for [facility] placement. Barbara allen states pt is

## 2024-12-30 NOTE — BH NOTES
GROUP THERAPY PROGRESS NOTE    The patient Porfirio perez 24 y.o. male is participating in [employer]. Group time: 1 hour    Personal goal for participation: To concentrate on selected task    Goal orientation: social    Group therapy participation: active    Therapeutic interventions reviewed and discussed: Crafts, games, music    Impression of participation: The patient was attentive.     Donna Records  7/10/2019  6:35 PM negative